# Patient Record
Sex: FEMALE | Race: WHITE
[De-identification: names, ages, dates, MRNs, and addresses within clinical notes are randomized per-mention and may not be internally consistent; named-entity substitution may affect disease eponyms.]

---

## 2021-09-13 ENCOUNTER — HOSPITAL ENCOUNTER (EMERGENCY)
Dept: HOSPITAL 82 - ED | Age: 76
Discharge: LEFT BEFORE DISCHARGE | End: 2021-09-13
Payer: MEDICARE

## 2021-09-13 VITALS — HEIGHT: 64 IN | WEIGHT: 189.38 LBS | BODY MASS INDEX: 32.33 KG/M2

## 2021-09-13 VITALS — DIASTOLIC BLOOD PRESSURE: 70 MMHG | SYSTOLIC BLOOD PRESSURE: 153 MMHG

## 2021-09-13 DIAGNOSIS — U07.1: Primary | ICD-10-CM

## 2021-09-13 DIAGNOSIS — I10: ICD-10-CM

## 2021-09-13 DIAGNOSIS — R09.02: ICD-10-CM

## 2021-09-13 DIAGNOSIS — Z91.19: ICD-10-CM

## 2021-09-13 LAB
ALBUMIN SERPL-MCNC: 3.7 G/DL (ref 3.2–5)
ALP SERPL-CCNC: 59 U/L (ref 38–126)
ANION GAP SERPL CALCULATED.3IONS-SCNC: 13 MMOL/L
AST SERPL-CCNC: 83 U/L (ref 9–36)
BASOPHILS NFR BLD AUTO: 0 % (ref 0–3)
BUN SERPL-MCNC: 21 MG/DL (ref 8–23)
BUN/CREAT SERPL: 17
CHLORIDE SERPL-SCNC: 93 MMOL/L (ref 95–108)
CO2 SERPL-SCNC: 30 MMOL/L (ref 22–30)
CREAT SERPL-MCNC: 1.2 MG/DL (ref 0.5–1)
EOSINOPHIL NFR BLD AUTO: 0 % (ref 0–8)
ERYTHROCYTE [DISTWIDTH] IN BLOOD BY AUTOMATED COUNT: 14.7 % (ref 11.5–15.5)
HCT VFR BLD AUTO: 45.3 % (ref 37–47)
HGB BLD-MCNC: 14.8 G/DL (ref 12–16)
IMM GRANULOCYTES NFR BLD: 0.3 % (ref 0–5)
LYMPHOCYTES NFR BLD: 8 % (ref 15–41)
MCH RBC QN AUTO: 31.5 PG  CALC (ref 26–32)
MCHC RBC AUTO-ENTMCNC: 32.7 G/DL CAL (ref 32–36)
MCV RBC AUTO: 96.4 FL  CALC (ref 80–100)
MONOCYTES NFR BLD AUTO: 13 % (ref 2–13)
MYOGLOBIN SERPL-MCNC: 137 NG/ML (ref 0–62)
NEUTROPHILS # BLD AUTO: 8.05 THOU/UL (ref 2–7.15)
NEUTROPHILS NFR BLD AUTO: 79 % (ref 42–76)
PLATELET # BLD AUTO: 218 THOU/UL (ref 130–400)
POTASSIUM SERPL-SCNC: 3.7 MMOL/L (ref 3.5–5.1)
PROT SERPL-MCNC: 7.2 G/DL (ref 6.3–8.2)
RBC # BLD AUTO: 4.7 MILL/UL (ref 4.2–5.6)
SODIUM SERPL-SCNC: 132 MMOL/L (ref 137–146)

## 2021-09-15 ENCOUNTER — HOSPITAL ENCOUNTER (INPATIENT)
Dept: HOSPITAL 82 - ED | Age: 76
LOS: 13 days | Discharge: NURSING, REHAB OR LONG-TERM CARE | DRG: 177 | End: 2021-09-28
Attending: STUDENT IN AN ORGANIZED HEALTH CARE EDUCATION/TRAINING PROGRAM | Admitting: STUDENT IN AN ORGANIZED HEALTH CARE EDUCATION/TRAINING PROGRAM
Payer: MEDICARE

## 2021-09-15 VITALS — HEIGHT: 60 IN | WEIGHT: 183.65 LBS | BODY MASS INDEX: 36.05 KG/M2

## 2021-09-15 VITALS — DIASTOLIC BLOOD PRESSURE: 78 MMHG | SYSTOLIC BLOOD PRESSURE: 169 MMHG

## 2021-09-15 DIAGNOSIS — R00.1: ICD-10-CM

## 2021-09-15 DIAGNOSIS — I10: ICD-10-CM

## 2021-09-15 DIAGNOSIS — J12.82: ICD-10-CM

## 2021-09-15 DIAGNOSIS — J96.01: ICD-10-CM

## 2021-09-15 DIAGNOSIS — F03.90: ICD-10-CM

## 2021-09-15 DIAGNOSIS — U07.1: Primary | ICD-10-CM

## 2021-09-15 DIAGNOSIS — G93.49: ICD-10-CM

## 2021-09-15 DIAGNOSIS — H35.30: ICD-10-CM

## 2021-09-15 DIAGNOSIS — F41.9: ICD-10-CM

## 2021-09-15 LAB
ALBUMIN SERPL-MCNC: 3.9 G/DL (ref 3.2–5)
ALP SERPL-CCNC: 73 U/L (ref 38–126)
ANION GAP SERPL CALCULATED.3IONS-SCNC: 13 MMOL/L
AST SERPL-CCNC: 66 U/L (ref 9–36)
BASOPHILS NFR BLD AUTO: 0 % (ref 0–3)
BILIRUB UR QL STRIP.AUTO: NEGATIVE
BUN SERPL-MCNC: 18 MG/DL (ref 8–23)
BUN/CREAT SERPL: 18
CHLORIDE SERPL-SCNC: 96 MMOL/L (ref 95–108)
CO2 SERPL-SCNC: 27 MMOL/L (ref 22–30)
COLOR UR AUTO: YELLOW
CREAT SERPL-MCNC: 1 MG/DL (ref 0.5–1)
CRP SERPL-MCNC: 7.3 MG/DL (ref 0–0.9)
EOSINOPHIL NFR BLD AUTO: 0 % (ref 0–8)
ERYTHROCYTE [DISTWIDTH] IN BLOOD BY AUTOMATED COUNT: 14.4 % (ref 11.5–15.5)
GLUCOSE UR STRIP.AUTO-MCNC: NEGATIVE MG/DL
HCT VFR BLD AUTO: 48.1 % (ref 37–47)
HGB BLD-MCNC: 15.5 G/DL (ref 12–16)
HGB UR QL STRIP.AUTO: NEGATIVE
IMM GRANULOCYTES NFR BLD: 1 % (ref 0–5)
KETONES UR STRIP.AUTO-MCNC: NEGATIVE MG/DL
LEUKOCYTE ESTERASE UR QL STRIP.AUTO: NEGATIVE
LYMPHOCYTES NFR BLD: 5 % (ref 15–41)
MCH RBC QN AUTO: 31.1 PG  CALC (ref 26–32)
MCHC RBC AUTO-ENTMCNC: 32.2 G/DL CAL (ref 32–36)
MCV RBC AUTO: 96.4 FL  CALC (ref 80–100)
MONOCYTES NFR BLD AUTO: 8 % (ref 2–13)
NEUTROPHILS # BLD AUTO: 10.36 THOU/UL (ref 2–7.15)
NEUTROPHILS NFR BLD AUTO: 86 % (ref 42–76)
NITRITE UR QL STRIP.AUTO: NEGATIVE
PH UR STRIP.AUTO: 7.5 [PH] (ref 4.5–8)
PLATELET # BLD AUTO: 320 THOU/UL (ref 130–400)
POTASSIUM SERPL-SCNC: 3.7 MMOL/L (ref 3.5–5.1)
PROT SERPL-MCNC: 7.5 G/DL (ref 6.3–8.2)
PROT UR QL STRIP.AUTO: (no result) MG/DL
RBC # BLD AUTO: 4.99 MILL/UL (ref 4.2–5.6)
SODIUM SERPL-SCNC: 132 MMOL/L (ref 137–146)
SP GR UR STRIP.AUTO: 1.02
UROBILINOGEN UR QL STRIP.AUTO: 1 E.U./DL

## 2021-09-15 PROCEDURE — S0166 INJ OLANZAPINE 2.5MG: HCPCS

## 2021-09-15 NOTE — NUR
PT ARRIVED ON UNIT ESCORTED BY ER STAFF VIA STRETCHER. ASSESSMENTS COMPLETE,
PLEASE SEE DOCUMENTATION. PT HAS INDICATED SHE HAS MACULAR DEGENERATION AND IS
LEGALLY BLIND. PT ALSO HAS A HX WITH CARPAL TUNNEL WHICH DECREASES HER ABILITY
TO FEEL ITEMS WITH HER FINGERS. CNA APPLIED TELEMETRY STCKER OVER CALL BA[employer]EL[employer] SO
THAT PT WILL BE ABLE TO FEEL STICKER AND
WHERE TO PRESS TO ASK FOR ASSISTANCE. PT WAS ABLE TO SUCCESSFULLY PRESS CALL
LIGHT X 2 WITHOUT ASSISTANCE. BREATHING IS EVEN AND UNLABORED. NO S/S OF
DISTRESS NOTED. IV REMAINS TO LFA-SL, FLUSHES EASILY. SAFETY PRECAUTIONS
REMAIN IN PLACE. WILL CONTINUE TO MONITOR

## 2021-09-15 NOTE — NUR
Admission Note
 
Report Given to:         LEXX BAILEY LPN
Transported by:           Wheelchair          X Stretcher
 
Transported with:        X Nurse     Transporter   X Patent IV   X O2
                         X Cardiac Monitor
 
Location:                 ICU      X MS2

## 2021-09-16 VITALS — SYSTOLIC BLOOD PRESSURE: 164 MMHG | DIASTOLIC BLOOD PRESSURE: 70 MMHG

## 2021-09-16 VITALS — SYSTOLIC BLOOD PRESSURE: 162 MMHG | DIASTOLIC BLOOD PRESSURE: 65 MMHG

## 2021-09-16 VITALS — DIASTOLIC BLOOD PRESSURE: 72 MMHG | SYSTOLIC BLOOD PRESSURE: 165 MMHG

## 2021-09-16 VITALS — DIASTOLIC BLOOD PRESSURE: 65 MMHG | SYSTOLIC BLOOD PRESSURE: 148 MMHG

## 2021-09-16 VITALS — SYSTOLIC BLOOD PRESSURE: 160 MMHG | DIASTOLIC BLOOD PRESSURE: 70 MMHG

## 2021-09-16 LAB
ALBUMIN SERPL-MCNC: 3 G/DL (ref 3.2–5)
ALP SERPL-CCNC: 63 U/L (ref 38–126)
ANION GAP SERPL CALCULATED.3IONS-SCNC: 13 MMOL/L
AST SERPL-CCNC: 50 U/L (ref 9–36)
BASOPHILS NFR BLD AUTO: 0 % (ref 0–3)
BUN SERPL-MCNC: 18 MG/DL (ref 8–23)
BUN/CREAT SERPL: 19
CHLORIDE SERPL-SCNC: 101 MMOL/L (ref 95–108)
CO2 SERPL-SCNC: 26 MMOL/L (ref 22–30)
CREAT SERPL-MCNC: 0.9 MG/DL (ref 0.5–1)
CRP SERPL-MCNC: 5.2 MG/DL (ref 0–0.9)
EOSINOPHIL NFR BLD AUTO: 0 % (ref 0–8)
ERYTHROCYTE [DISTWIDTH] IN BLOOD BY AUTOMATED COUNT: 14.2 % (ref 11.5–15.5)
HCT VFR BLD AUTO: 44.8 % (ref 37–47)
HGB BLD-MCNC: 14.4 G/DL (ref 12–16)
IMM GRANULOCYTES NFR BLD: 1 % (ref 0–5)
LYMPHOCYTES NFR BLD: 5 % (ref 15–41)
MCH RBC QN AUTO: 31.4 PG  CALC (ref 26–32)
MCHC RBC AUTO-ENTMCNC: 32.1 G/DL CAL (ref 32–36)
MCV RBC AUTO: 97.6 FL  CALC (ref 80–100)
MONOCYTES NFR BLD AUTO: 4 % (ref 2–13)
NEUTROPHILS # BLD AUTO: 6.85 THOU/UL (ref 2–7.15)
NEUTROPHILS NFR BLD AUTO: 89 % (ref 42–76)
PLATELET # BLD AUTO: 314 THOU/UL (ref 130–400)
POTASSIUM SERPL-SCNC: 4.2 MMOL/L (ref 3.5–5.1)
PROT SERPL-MCNC: 6.1 G/DL (ref 6.3–8.2)
RBC # BLD AUTO: 4.59 MILL/UL (ref 4.2–5.6)
SODIUM SERPL-SCNC: 135 MMOL/L (ref 137–146)

## 2021-09-16 PROCEDURE — XW033E5 INTRODUCTION OF REMDESIVIR ANTI-INFECTIVE INTO PERIPHERAL VEIN, PERCUTANEOUS APPROACH, NEW TECHNOLOGY GROUP 5: ICD-10-PCS | Performed by: STUDENT IN AN ORGANIZED HEALTH CARE EDUCATION/TRAINING PROGRAM

## 2021-09-16 NOTE — NUR
PT RESTING QUIETLY. BREATHING IS EVEN AND UNLABORED. NO COMPLAINTS VOICED AT
THIS TIME. SAFETY PRECAUTIONS REMAIN IN PLACE. WILL MONITOR

## 2021-09-16 NOTE — NUR
PT RESTING QUIETLY, NO CONCERNS VOICED. NO S/S OF DISTRESS NOTED. BREATHING IS
EVEN AND UNLABORED. PT CONTINUES ON 7L OF HIGH FLOW O2 VIA NC, TOLERATING
WELL. O2 SATURATIONS REMAINING ABOVE 90% ON CURRENT O2 ORDERS. SAFETY
PRECAUTIONS REMAIN IN PLACE. WILL MONITOR

## 2021-09-16 NOTE — NUR
pt resting quietly in bed, no complaints voiced at this time. denies pain.
breathing even and unlabored. pt refused lovenox injection, educated on the
rationale behind medication, pt continued to refuse. assessments completed,
please see documentation. safety precautions remain in place. will continue to
monitor

## 2021-09-17 VITALS — DIASTOLIC BLOOD PRESSURE: 57 MMHG | SYSTOLIC BLOOD PRESSURE: 134 MMHG

## 2021-09-17 VITALS — DIASTOLIC BLOOD PRESSURE: 74 MMHG | SYSTOLIC BLOOD PRESSURE: 174 MMHG

## 2021-09-17 VITALS — SYSTOLIC BLOOD PRESSURE: 154 MMHG | DIASTOLIC BLOOD PRESSURE: 68 MMHG

## 2021-09-17 VITALS — DIASTOLIC BLOOD PRESSURE: 69 MMHG | SYSTOLIC BLOOD PRESSURE: 161 MMHG

## 2021-09-17 VITALS — SYSTOLIC BLOOD PRESSURE: 162 MMHG | DIASTOLIC BLOOD PRESSURE: 70 MMHG

## 2021-09-17 LAB
ALBUMIN SERPL-MCNC: 3.2 G/DL (ref 3.2–5)
ALP SERPL-CCNC: 63 U/L (ref 38–126)
ANION GAP SERPL CALCULATED.3IONS-SCNC: 12 MMOL/L
AST SERPL-CCNC: 55 U/L (ref 9–36)
BASOPHILS NFR BLD AUTO: 1 % (ref 0–3)
BUN SERPL-MCNC: 23 MG/DL (ref 8–23)
BUN/CREAT SERPL: 24
CHLORIDE SERPL-SCNC: 102 MMOL/L (ref 95–108)
CO2 SERPL-SCNC: 27 MMOL/L (ref 22–30)
CREAT SERPL-MCNC: 1 MG/DL (ref 0.5–1)
EOSINOPHIL NFR BLD AUTO: 0 % (ref 0–8)
ERYTHROCYTE [DISTWIDTH] IN BLOOD BY AUTOMATED COUNT: 14.2 % (ref 11.5–15.5)
HCT VFR BLD AUTO: 47.3 % (ref 37–47)
HGB BLD-MCNC: 15.1 G/DL (ref 12–16)
IMM GRANULOCYTES NFR BLD: 0.9 % (ref 0–5)
LYMPHOCYTES NFR BLD: 8 % (ref 15–41)
MCH RBC QN AUTO: 31.5 PG  CALC (ref 26–32)
MCHC RBC AUTO-ENTMCNC: 31.9 G/DL CAL (ref 32–36)
MCV RBC AUTO: 98.7 FL  CALC (ref 80–100)
MONOCYTES NFR BLD AUTO: 8 % (ref 2–13)
NEUTROPHILS # BLD AUTO: 10.51 THOU/UL (ref 2–7.15)
NEUTROPHILS NFR BLD AUTO: 83 % (ref 42–76)
PLATELET # BLD AUTO: 368 THOU/UL (ref 130–400)
POTASSIUM SERPL-SCNC: 4.4 MMOL/L (ref 3.5–5.1)
PROT SERPL-MCNC: 6.3 G/DL (ref 6.3–8.2)
RBC # BLD AUTO: 4.79 MILL/UL (ref 4.2–5.6)
SODIUM SERPL-SCNC: 137 MMOL/L (ref 137–146)

## 2021-09-17 NOTE — NUR
PT LYING IN BED EYES CLOSED. MED ADMIN, IS REINFORCEMENT, TEACH AND TRAIN ON
INHAILER USE/SE/INDICATION. VERBALIZES UNDERSTANDING.

## 2021-09-17 NOTE — NUR
PATIENT RESTING COMFORTABLY.NORMAL HEART SOUNDS SB ON TELE, CRACKLES IN LOWER
BASES, DIMMINSHED LUNG SOUNDS ELSEWHERE.  PT ON 8L HF READING 94-96%.  ACTIVE
BOWEL SOUNDS LAST BM 9/16, DENIES ANY ISSUES.  #18 IN THE LAC SALINE LOCK PLAN
OF CARE REVIEWED, CALL LIGHT AND BEDSIDE TABLE WITHIN REACH

## 2021-09-17 NOTE — NUR
PT RESTING QUIETLY AT THIS TIME, NO COMPLANITS VOICED. BREATHING EVEN AND
UNLABORED. BREATHING IS SHALLOW AT TIMES. SAFETY PRECAUTIONS REMAIN IN PLACE.
NO S/S OF DISTRESS

## 2021-09-17 NOTE — NUR
BEDSIDE REPORT RECEIVED, PT SITTING IN CHAIR WITH EYES CLOSED. ASSESSMENT
PERFORMED. PT STATING SHE DOESNT FEEL GOOD AND SHE CANT DO ANYTHING FOR
HERSELF. BREAKFAST PLACED AT BEDSIDE TABLE, PT STATED ITS GOING TO TAKE HER A
FEW HOURS TO EAT. JUST LEAVE IT THERE. NO DISTRESS AT THIS TIME WILL CONTINUE
TO MONITOR.

## 2021-09-17 NOTE — NUR
pt remains stable at this time. o2 saturations remain between 90%-93%.
breathing even and unlabored. safety precautions in place, will monitor

## 2021-09-17 NOTE — NUR
UPON WALKING PAST PT ROOM, NOTED THAT PT WAS UP TO BSC WITHOUT HER O2 ON,
PLACED PT BACK ON OYGEN AS PER ORDER AND NOTED THAT PT O2 SATURATION WAS 75%,
PT WAS TAKING AN EXTENDED PERIOD OF TIME TO RECOVER, INCREASED HIGH FLOW TO
10L WHILE PT IS RECOVERING. PT REFUSES TO LAY IN THE PRONE POSITION. WILL
DECREASE O2 ONCE PT HAS FULLY RECOVERED TO A NORMAL O2 SATURATION.

## 2021-09-18 VITALS — DIASTOLIC BLOOD PRESSURE: 69 MMHG | SYSTOLIC BLOOD PRESSURE: 147 MMHG

## 2021-09-18 VITALS — DIASTOLIC BLOOD PRESSURE: 87 MMHG | SYSTOLIC BLOOD PRESSURE: 165 MMHG

## 2021-09-18 VITALS — SYSTOLIC BLOOD PRESSURE: 161 MMHG | DIASTOLIC BLOOD PRESSURE: 66 MMHG

## 2021-09-18 VITALS — DIASTOLIC BLOOD PRESSURE: 92 MMHG | SYSTOLIC BLOOD PRESSURE: 181 MMHG

## 2021-09-18 VITALS — SYSTOLIC BLOOD PRESSURE: 162 MMHG | DIASTOLIC BLOOD PRESSURE: 67 MMHG

## 2021-09-18 VITALS — SYSTOLIC BLOOD PRESSURE: 160 MMHG | DIASTOLIC BLOOD PRESSURE: 67 MMHG

## 2021-09-18 LAB
ALBUMIN SERPL-MCNC: 2.7 G/DL (ref 3.2–5)
ALP SERPL-CCNC: 57 U/L (ref 38–126)
ANION GAP SERPL CALCULATED.3IONS-SCNC: 11 MMOL/L
AST SERPL-CCNC: 48 U/L (ref 9–36)
BASOPHILS NFR BLD AUTO: 0 % (ref 0–3)
BUN SERPL-MCNC: 22 MG/DL (ref 8–23)
BUN/CREAT SERPL: 25
CHLORIDE SERPL-SCNC: 103 MMOL/L (ref 95–108)
CO2 SERPL-SCNC: 26 MMOL/L (ref 22–30)
CREAT SERPL-MCNC: 0.9 MG/DL (ref 0.5–1)
CRP SERPL-MCNC: 1.8 MG/DL (ref 0–0.9)
EOSINOPHIL NFR BLD AUTO: 0 % (ref 0–8)
ERYTHROCYTE [DISTWIDTH] IN BLOOD BY AUTOMATED COUNT: 14 % (ref 11.5–15.5)
HCT VFR BLD AUTO: 43.3 % (ref 37–47)
HGB BLD-MCNC: 14 G/DL (ref 12–16)
IMM GRANULOCYTES NFR BLD: 1.3 % (ref 0–5)
LYMPHOCYTES NFR BLD: 7 % (ref 15–41)
MCH RBC QN AUTO: 31.7 PG  CALC (ref 26–32)
MCHC RBC AUTO-ENTMCNC: 32.3 G/DL CAL (ref 32–36)
MCV RBC AUTO: 98 FL  CALC (ref 80–100)
MONOCYTES NFR BLD AUTO: 10 % (ref 2–13)
NEUTROPHILS # BLD AUTO: 7.94 THOU/UL (ref 2–7.15)
NEUTROPHILS NFR BLD AUTO: 82 % (ref 42–76)
PLATELET # BLD AUTO: 378 THOU/UL (ref 130–400)
POTASSIUM SERPL-SCNC: 3.9 MMOL/L (ref 3.5–5.1)
PROT SERPL-MCNC: 5.6 G/DL (ref 6.3–8.2)
RBC # BLD AUTO: 4.42 MILL/UL (ref 4.2–5.6)
SODIUM SERPL-SCNC: 136 MMOL/L (ref 137–146)

## 2021-09-18 NOTE — NUR
ALERT AND ORIENTED X 3, VS AND ASSEMENT COMPLETED.  DIMMINSHED/CRACKLES LUNG
SOUNDS THROUGHOUT BASES, PT ON 15L 93% NORMAL HEART SOUNDS, LAST TELE READING
SB 46. NON PRODUCTIVE COUGH. PT DENIES NEED FOR COUGH MEDICINE AT
THIS TIME. PT  ACTIVE BOWEL SOUNDS THROUGHOUT ALL QUADRANTS, LAST REPORTED
BOWEL MOVEMENT 9/18. #18 LFA SL, FLUSHED AND PATENT. PLAN OF CARE REVIEWED,
CALL LIGHT AND BEDSIDE TABLE WITHIN REACH.

## 2021-09-18 NOTE — NUR
PATIENT IS RESTING IN BED AND CALM. SITTER AT BED SIDE. PATIENT STATED THAT
SHE IS IN THE [facility] AND IF WE ARE GETTING MORE ANIMALS. REORIENT
PATIENT AND PATIENT VERNALIZE UNDERSTANDING. CALL LIGHT IN REACH.

## 2021-09-18 NOTE — NUR
PATIENT HAS SITTER AT BEDSIDE. PATIENT TOOK HER TELE OFF AND REFUSING THE TELE
TO BE PLACE BACK AT THIS TIME. CALL LIGHT IN REACH. DR. GISELE PENG..

## 2021-09-18 NOTE — NUR
Patient didn't participate with PT intervention today (patient was sound
asleep and PT wasn't able to wake patient up).

## 2021-09-18 NOTE — NUR
PATIENT IS SITTING IN THE SIDE OF THE BED. PATIENT STATED I'M LEAVEING I DON'T
NEED TO BE HERE. PATIENT STATED THAT THEY ARE NOT DOING ANYTHING FOR
HER.PATIENT IS ANXIOUS.  ORIENT PATIENT TO ROOM AND PLACE. ASSESSMENT DONE.
PATIENT IS ALERT AND ORIENT X2. PATIENT DENIES PAIN. PATIENT DENIES SOB. O2 AT
8L HF AND O2 READING 86%.  INCREASE O2 TO 15L HFNC AND O2 READING 90%. PATIENT
STATED SHE GOING HOME. PATIENT IS AGITATED.  ENCOURAGE PATIENT TO STAY BECAUSE
HER O2 IS LOW. PATIENT STATED SHE NOT TAKING HER MEDICATIONS. STATED SHE WANTS
TO SEE DOCTOR. EXPLAIN TO PATIENT THAT DOCTOR IS ROUNDING WILL BE SEEING HER
SOON. SAFETY PRECAUTIONS REINFORCED.  BED ALARM IN PLACE AND CALL LIGHT IN
REACH. NOTIFIED TAN EPSTEIN AND DR. JAQUEZ ABOUT PATIENT WANTING TO LEAVE, BP
181/92, AND O2 AT 15L HFNC.

## 2021-09-18 NOTE — NUR
PATIENT SET OFF THE BED ALARM. FOUND PATIENT KNEELING  IN THE FLOOR NEXT TO
 THE BED AND NAKED. PATIENT DENIES FALLING.  ASSISTED PATINET WITH GOWN AND
BACK TO BED. PATIENT IS UNSTEADY. PATIENT STATED SHE WANTS TO LEAVE. O2 15L
HFNC AND READING 93%. BED ALRAM AND CALL LIGHT IN REACH. NOTIFIED DR. JAQUEZ
ABOUT PATIENT. ORDERS RECEIVED.

## 2021-09-19 VITALS — SYSTOLIC BLOOD PRESSURE: 160 MMHG | DIASTOLIC BLOOD PRESSURE: 70 MMHG

## 2021-09-19 VITALS — DIASTOLIC BLOOD PRESSURE: 70 MMHG | SYSTOLIC BLOOD PRESSURE: 150 MMHG

## 2021-09-19 VITALS — SYSTOLIC BLOOD PRESSURE: 165 MMHG | DIASTOLIC BLOOD PRESSURE: 66 MMHG

## 2021-09-19 VITALS — SYSTOLIC BLOOD PRESSURE: 163 MMHG | DIASTOLIC BLOOD PRESSURE: 74 MMHG

## 2021-09-19 VITALS — DIASTOLIC BLOOD PRESSURE: 72 MMHG | SYSTOLIC BLOOD PRESSURE: 158 MMHG

## 2021-09-19 VITALS — SYSTOLIC BLOOD PRESSURE: 130 MMHG | DIASTOLIC BLOOD PRESSURE: 60 MMHG

## 2021-09-19 LAB
ALBUMIN SERPL-MCNC: 2.6 G/DL (ref 3.2–5)
ALP SERPL-CCNC: 54 U/L (ref 38–126)
ANION GAP SERPL CALCULATED.3IONS-SCNC: 11 MMOL/L
AST SERPL-CCNC: 41 U/L (ref 9–36)
BASOPHILS NFR BLD AUTO: 0 % (ref 0–3)
BUN SERPL-MCNC: 21 MG/DL (ref 8–23)
BUN/CREAT SERPL: 25
CHLORIDE SERPL-SCNC: 105 MMOL/L (ref 95–108)
CO2 SERPL-SCNC: 26 MMOL/L (ref 22–30)
CREAT SERPL-MCNC: 0.8 MG/DL (ref 0.5–1)
EOSINOPHIL NFR BLD AUTO: 0 % (ref 0–8)
ERYTHROCYTE [DISTWIDTH] IN BLOOD BY AUTOMATED COUNT: 13.9 % (ref 11.5–15.5)
HCT VFR BLD AUTO: 45.1 % (ref 37–47)
HGB BLD-MCNC: 14.3 G/DL (ref 12–16)
IMM GRANULOCYTES NFR BLD: 1.2 % (ref 0–5)
LYMPHOCYTES NFR BLD: 5 % (ref 15–41)
MCH RBC QN AUTO: 31.5 PG  CALC (ref 26–32)
MCHC RBC AUTO-ENTMCNC: 31.7 G/DL CAL (ref 32–36)
MCV RBC AUTO: 99.3 FL  CALC (ref 80–100)
MONOCYTES NFR BLD AUTO: 11 % (ref 2–13)
NEUTROPHILS # BLD AUTO: 9.38 THOU/UL (ref 2–7.15)
NEUTROPHILS NFR BLD AUTO: 82 % (ref 42–76)
PLATELET # BLD AUTO: 411 THOU/UL (ref 130–400)
POTASSIUM SERPL-SCNC: 4.2 MMOL/L (ref 3.5–5.1)
PROT SERPL-MCNC: 5.4 G/DL (ref 6.3–8.2)
RBC # BLD AUTO: 4.54 MILL/UL (ref 4.2–5.6)
SODIUM SERPL-SCNC: 137 MMOL/L (ref 137–146)

## 2021-09-19 NOTE — NUR
PT SLEEPING, APPEARS CALM, AWOKE TO MY VOICE. OXYGEN SAT @95% ON 12L
PHYSICIAN AND APRN IN WITH PT AT THIS TIME.

## 2021-09-19 NOTE — NUR
RECEIVED CALL FROM PT'S SON, INFORMED HIM OF CARE STATUS AND PT'S STATUS AT
THIS TIME. VERBALIZED UNDERSTANDING AND THANKS FOR CARE PROVIDED. I ENCOURAGED
HIM TO CALL BACK ANYTIME FOR INFORMATION AND UPDATE.

## 2021-09-19 NOTE — NUR
t medicated as orders provide for AM meds. V/s assessed at this time. Pt
oiced mild anxiety over having to be "in this room in the hospital."  I
eassured her when her oxygen sat levels improve she can be discharged. Pt is
6% on 15Lnc High flow, titated to 12L with stable 02 sats holding at 95% Will
ontinue to monitor for maintaining 02 sats.  Pt denies n/v/d or cough at this
bang. Pt answered questions appropriately w/year, location/circumstance, self,
ob.  Sitter is at side and monitor in place.

## 2021-09-19 NOTE — NUR
PT CALLED TO REPORT BEEPING IN ROOM. IV ANTI-VIRAL COMPLETE AND IV ANTIBIOTIC
THERAPY ADMINISTERED AT THIS TIME. PT IS ASKING QUESTIONS ABOUT THE
MEDICATIONS WE ARE GIVING HER AND QUESTIONING HOW LONG IT IS TAKING. I
REASSURED HER THAT IT IS NOT A QUICK ILLNESS TO OVER AND THAT OUR MAIN GOAL AT
THIS POINT IS TO KEEP HER OXYGEN SAT LEVELS STABLE. I EDUCATED HER ON THE
PROCESS IF WE ARE UNABLE TO MAINTAIN HER 02 LEVELS, SHE VERBALIZED
UNDERSTANDING. WE PRACTICED THE CALL LIGHT SEVERAL TIMES WHILE I WAS IN THE
ROOM, SHE EXPRESSED CONCERN THAT SHE FELT IT WASN'T WORKING. BED ALARM ON
W/MONITOR ON AND SOUND UP AT NURSES STATION FOR MONITORING.

## 2021-09-19 NOTE — NUR
ALERT AND ORIENTED X 3, VS AND ASSEMENT COMPLETED.  DIMMINSHED/CRACKLES LUNG
SOUNDS THROUGHOUT BASES, PT ON 10L 93% NORMAL HEART SOUNDS, LAST TELE READING
SB 56. NON PRODUCTIVE COUGH. PT DENIES NEED FOR COUGH MEDICINE AT
THIS TIME. PT  ACTIVE BOWEL SOUNDS THROUGHOUT ALL QUADRANTS, LAST REPORTED
BOWEL MOVEMENT 9/19. #18 LFA SL, FLUSHED AND PATENT. PLAN OF CARE REVIEWED,
CALL LIGHT AND BEDSIDE TABLE WITHIN REACH.

## 2021-09-19 NOTE — NUR
PATIENT RESTING COMFROTABLY, SITTER OUTSIDE OF DOOR DENIES ANY NEEDS AT THIS
TIME, CALL LIGHT AND BEDSIDE TABLE WITHIN REACH.

## 2021-09-19 NOTE — NUR
PT ASSISTED TO BSC AND BACK TO THE BED. PT REMINDED OF CALL LIGHT, DUE TO
SETTING OFF BED ALARM. PT VOICES FRUSTRATION AT HAVING TO CALL FOR ASSISTANCE,
STATING "I CAN GET UP MYSELF, I DON'T NEED YOU."  I REMINDED HER OF BEING
FOUND ON THE FLOOR THE PRIOR DAY.  SHE ASKED "WHAT NURSE SAID THAT? I WAS NOT
ON THE FLOOR."  PT ASKED FOR PUDDING, PROVIDED.  SHE IS NOW REFUSING THE
PUDDING AT THIS TIME AND SAYING SHE WANTS TO WAIT TO EAT THAT, IT IS LEFT AT
BEDSIDE. PT NOW BACK IN THE BED W/TABLE W/IN REACH. SHE HAS BEEN REMINDED OF
CALL LIGHT USE MULTIPLE TIMES AND BED ALARM PLACED BACK ON SENSITIVE MODE WITH
MONITOR VOLUME UP AT NURSES STATION.

## 2021-09-20 VITALS — SYSTOLIC BLOOD PRESSURE: 185 MMHG | DIASTOLIC BLOOD PRESSURE: 74 MMHG

## 2021-09-20 VITALS — SYSTOLIC BLOOD PRESSURE: 156 MMHG | DIASTOLIC BLOOD PRESSURE: 68 MMHG

## 2021-09-20 VITALS — SYSTOLIC BLOOD PRESSURE: 148 MMHG | DIASTOLIC BLOOD PRESSURE: 65 MMHG

## 2021-09-20 VITALS — DIASTOLIC BLOOD PRESSURE: 69 MMHG | SYSTOLIC BLOOD PRESSURE: 164 MMHG

## 2021-09-20 VITALS — SYSTOLIC BLOOD PRESSURE: 164 MMHG | DIASTOLIC BLOOD PRESSURE: 69 MMHG

## 2021-09-20 VITALS — DIASTOLIC BLOOD PRESSURE: 70 MMHG | SYSTOLIC BLOOD PRESSURE: 153 MMHG

## 2021-09-20 LAB
ALBUMIN SERPL-MCNC: 2.7 G/DL (ref 3.2–5)
ALP SERPL-CCNC: 58 U/L (ref 38–126)
ANION GAP SERPL CALCULATED.3IONS-SCNC: 10 MMOL/L
AST SERPL-CCNC: 33 U/L (ref 9–36)
BASOPHILS NFR BLD AUTO: 0 % (ref 0–3)
BUN SERPL-MCNC: 21 MG/DL (ref 8–23)
BUN/CREAT SERPL: 24
CHLORIDE SERPL-SCNC: 103 MMOL/L (ref 95–108)
CO2 SERPL-SCNC: 27 MMOL/L (ref 22–30)
CREAT SERPL-MCNC: 0.9 MG/DL (ref 0.5–1)
CRP SERPL-MCNC: 3.9 MG/DL (ref 0–0.9)
EOSINOPHIL NFR BLD AUTO: 1 % (ref 0–8)
ERYTHROCYTE [DISTWIDTH] IN BLOOD BY AUTOMATED COUNT: 14.1 % (ref 11.5–15.5)
HCT VFR BLD AUTO: 45.4 % (ref 37–47)
HGB BLD-MCNC: 14.2 G/DL (ref 12–16)
IMM GRANULOCYTES NFR BLD: 1.8 % (ref 0–5)
LYMPHOCYTES NFR BLD: 5 % (ref 15–41)
MCH RBC QN AUTO: 31 PG  CALC (ref 26–32)
MCHC RBC AUTO-ENTMCNC: 31.3 G/DL CAL (ref 32–36)
MCV RBC AUTO: 99.1 FL  CALC (ref 80–100)
MONOCYTES NFR BLD AUTO: 10 % (ref 2–13)
NEUTROPHILS # BLD AUTO: 10.03 THOU/UL (ref 2–7.15)
NEUTROPHILS NFR BLD AUTO: 82 % (ref 42–76)
PLATELET # BLD AUTO: 388 THOU/UL (ref 130–400)
POTASSIUM SERPL-SCNC: 4.2 MMOL/L (ref 3.5–5.1)
PROT SERPL-MCNC: 5.4 G/DL (ref 6.3–8.2)
RBC # BLD AUTO: 4.58 MILL/UL (ref 4.2–5.6)
SODIUM SERPL-SCNC: 136 MMOL/L (ref 137–146)

## 2021-09-20 NOTE — NUR
PT SITTING UP FINISHING DINNER. SPO2 90% ON HIGH FLOW NASAL CANNULA AT
10L/MIN. WATER FOR HUMIDITY REPLACED. PT RESPIRATIONS REGULAR AND UNLABORED.
ABLE TO HOLD CONVERSATION WITHOUT DROP IN SP02. PT ASSISTED UP TO BSC TO VOID
AND BACK TO BED.

## 2021-09-20 NOTE — NUR
PATIENT SLEEPING SOUNDLY, IN NO APPARENT DISTRESS, AWOKEN BY WRITTER, DENIES
ANY NEEDS, DRY COUGH NOTED. CALL LIGHT AND BEDSIDE TABLE WITHIN REACH.

## 2021-09-20 NOTE — NUR
PATIENT OBSERVED GETTTING OUT OF BED AND ON THE BEDSIDE COMMODE, STATES SHE
DOES NOT LIKE THE SOUND OF THE BED ALARM GOING OFF, PT ADVISED TO CALL AND WE
WOULD ASSIST HER. PT ASSSITED BACK TO BED, CALL LIGHT AND BEDSIDE TABLE WITHIN
REACH.

## 2021-09-21 VITALS — DIASTOLIC BLOOD PRESSURE: 75 MMHG | SYSTOLIC BLOOD PRESSURE: 180 MMHG

## 2021-09-21 VITALS — DIASTOLIC BLOOD PRESSURE: 58 MMHG | SYSTOLIC BLOOD PRESSURE: 121 MMHG

## 2021-09-21 VITALS — SYSTOLIC BLOOD PRESSURE: 153 MMHG | DIASTOLIC BLOOD PRESSURE: 69 MMHG

## 2021-09-21 VITALS — DIASTOLIC BLOOD PRESSURE: 91 MMHG | SYSTOLIC BLOOD PRESSURE: 159 MMHG

## 2021-09-21 VITALS — DIASTOLIC BLOOD PRESSURE: 65 MMHG | SYSTOLIC BLOOD PRESSURE: 152 MMHG

## 2021-09-21 LAB
ALBUMIN SERPL-MCNC: 2.6 G/DL (ref 3.2–5)
ALP SERPL-CCNC: 56 U/L (ref 38–126)
ANION GAP SERPL CALCULATED.3IONS-SCNC: 9 MMOL/L
AST SERPL-CCNC: 30 U/L (ref 9–36)
BASOPHILS NFR BLD AUTO: 0 % (ref 0–3)
BUN SERPL-MCNC: 24 MG/DL (ref 8–23)
BUN/CREAT SERPL: 25
CHLORIDE SERPL-SCNC: 107 MMOL/L (ref 95–108)
CO2 SERPL-SCNC: 25 MMOL/L (ref 22–30)
CREAT SERPL-MCNC: 1 MG/DL (ref 0.5–1)
EOSINOPHIL NFR BLD AUTO: 0 % (ref 0–8)
ERYTHROCYTE [DISTWIDTH] IN BLOOD BY AUTOMATED COUNT: 14.1 % (ref 11.5–15.5)
HCT VFR BLD AUTO: 45.6 % (ref 37–47)
HGB BLD-MCNC: 14.3 G/DL (ref 12–16)
IMM GRANULOCYTES NFR BLD: 1.8 % (ref 0–5)
LYMPHOCYTES NFR BLD: 5 % (ref 15–41)
MCH RBC QN AUTO: 30.9 PG  CALC (ref 26–32)
MCHC RBC AUTO-ENTMCNC: 31.4 G/DL CAL (ref 32–36)
MCV RBC AUTO: 98.5 FL  CALC (ref 80–100)
MONOCYTES NFR BLD AUTO: 11 % (ref 2–13)
NEUTROPHILS # BLD AUTO: 9.12 THOU/UL (ref 2–7.15)
NEUTROPHILS NFR BLD AUTO: 82 % (ref 42–76)
PLATELET # BLD AUTO: 414 THOU/UL (ref 130–400)
POTASSIUM SERPL-SCNC: 4.3 MMOL/L (ref 3.5–5.1)
PROT SERPL-MCNC: 5.4 G/DL (ref 6.3–8.2)
RBC # BLD AUTO: 4.63 MILL/UL (ref 4.2–5.6)
SODIUM SERPL-SCNC: 137 MMOL/L (ref 137–146)

## 2021-09-21 NOTE — NUR
PT RESTING COMFORTABLY IN BED.  NO SIGNS OF PAIN OR DISTRESS.  PT HAS NO
IMMEDIATE NEEDS AT THIS TIME.  SAFETY PRECAUTIONS MAINTAINED. CALL LIGHT
WITHIN PATIENT REACH.  WILL CONTINUE TO MONITOR

## 2021-09-21 NOTE — NUR
PT AWAKE ALERT AND ORIENTED.  PT DENIES ANY PAIN OR DISTRESS AT THIS TIME.  IV
INTACT AND PATENT.  VS AND ASSESSMENT COMPLETE.  LUNGS DIMINISHED. BREATHS
EVEN AND UNLABORED ON HIGH FLOW OXYGEN VIA NASAL CANNULA.  PERIPHERAL PULSES
PALPABLE AND STRONG.  ABDOMEN ROUND AND NONTENDER.  SKIN IS INTACT.  SAFETY
PRECAUTIONS ARE IN PLACE AND CALL LIGHT IS WITHIN PATIENTS REACH.  WILL
MONITOR PT CLOSELY

## 2021-09-21 NOTE — NUR
ASSESSMENT COMPLETE. PATIENT ALERT AND ORIENTED. ANSWERS APPROPRIATELY. DENIES
ANY PAIN OR SHORTNESS OF BREATH. CALL LIGHT AND BELONGINGS WITHIN REACH.

## 2021-09-22 VITALS — SYSTOLIC BLOOD PRESSURE: 151 MMHG | DIASTOLIC BLOOD PRESSURE: 65 MMHG

## 2021-09-22 VITALS — SYSTOLIC BLOOD PRESSURE: 140 MMHG | DIASTOLIC BLOOD PRESSURE: 68 MMHG

## 2021-09-22 VITALS — DIASTOLIC BLOOD PRESSURE: 81 MMHG | SYSTOLIC BLOOD PRESSURE: 187 MMHG

## 2021-09-22 VITALS — DIASTOLIC BLOOD PRESSURE: 69 MMHG | SYSTOLIC BLOOD PRESSURE: 162 MMHG

## 2021-09-22 VITALS — SYSTOLIC BLOOD PRESSURE: 142 MMHG | DIASTOLIC BLOOD PRESSURE: 64 MMHG

## 2021-09-22 VITALS — DIASTOLIC BLOOD PRESSURE: 72 MMHG | SYSTOLIC BLOOD PRESSURE: 166 MMHG

## 2021-09-22 VITALS — DIASTOLIC BLOOD PRESSURE: 68 MMHG | SYSTOLIC BLOOD PRESSURE: 152 MMHG

## 2021-09-22 VITALS — DIASTOLIC BLOOD PRESSURE: 105 MMHG | SYSTOLIC BLOOD PRESSURE: 167 MMHG

## 2021-09-22 LAB
ALBUMIN SERPL-MCNC: 2.8 G/DL (ref 3.2–5)
ALP SERPL-CCNC: 62 U/L (ref 38–126)
ANION GAP SERPL CALCULATED.3IONS-SCNC: 7 MMOL/L
AST SERPL-CCNC: 30 U/L (ref 9–36)
BASOPHILS NFR BLD AUTO: 0 % (ref 0–3)
BUN SERPL-MCNC: 23 MG/DL (ref 8–23)
BUN/CREAT SERPL: 26
CHLORIDE SERPL-SCNC: 106 MMOL/L (ref 95–108)
CO2 SERPL-SCNC: 27 MMOL/L (ref 22–30)
CREAT SERPL-MCNC: 0.9 MG/DL (ref 0.5–1)
CRP SERPL-MCNC: 3.6 MG/DL (ref 0–0.9)
EOSINOPHIL NFR BLD AUTO: 0 % (ref 0–8)
ERYTHROCYTE [DISTWIDTH] IN BLOOD BY AUTOMATED COUNT: 14.1 % (ref 11.5–15.5)
HCT VFR BLD AUTO: 46.2 % (ref 37–47)
HGB BLD-MCNC: 14.9 G/DL (ref 12–16)
IMM GRANULOCYTES NFR BLD: 1.9 % (ref 0–5)
LYMPHOCYTES NFR BLD: 5 % (ref 15–41)
MCH RBC QN AUTO: 31.6 PG  CALC (ref 26–32)
MCHC RBC AUTO-ENTMCNC: 32.3 G/DL CAL (ref 32–36)
MCV RBC AUTO: 98.1 FL  CALC (ref 80–100)
MONOCYTES NFR BLD AUTO: 11 % (ref 2–13)
NEUTROPHILS # BLD AUTO: 10.46 THOU/UL (ref 2–7.15)
NEUTROPHILS NFR BLD AUTO: 82 % (ref 42–76)
PLATELET # BLD AUTO: 403 THOU/UL (ref 130–400)
POTASSIUM SERPL-SCNC: 4.3 MMOL/L (ref 3.5–5.1)
PROT SERPL-MCNC: 5.9 G/DL (ref 6.3–8.2)
RBC # BLD AUTO: 4.71 MILL/UL (ref 4.2–5.6)
SODIUM SERPL-SCNC: 136 MMOL/L (ref 137–146)

## 2021-09-22 NOTE — NUR
Alert and oriented x3. Patient laying in bed. Normal heart sounds,
last tele reading sr 61. Clear/Dimminished lung sounds, on 15L HF reading 95%.
Active bowel sounds throughout all quadrants. Last reported bowel movement
9/22. #18 in LFA Sl, flushed and patent. Plan of care reviewed with patient,
call light and bedside table within reach.

## 2021-09-22 NOTE — NUR
Patient participated with PT intervention today.  Patient carried out B LE
AAROM exercises:  hip flexion, hip adduction, hip abduction, hamstring curls,
knee extension, and ankle pumps for 10 reps x 2 sets with occasional verbal
and tactile cuing to help decrease trick movements and fall risks.  Patient
participated with log rolling bed mobility ADLs with 1 to 2 attempts with
verbal and tactile cuing to help decrease trick movements and fall risks.

## 2021-09-22 NOTE — NUR
PATIENT RESTING IN BED AT THIS TIME. PATIENT REMAINS ON 15L OF HI-FLOW O2 AT
THIS TIME. SPO2 CURRENTLY IS 90%. PATIENT SIDERAILS ARE UP CALL LIGHT WITHIN
REACH. PATIENT BED ALARM ENGAGED AND PATIENT REMAIS TO BE VISUALIZED BY
CAMERA. PATIENT REFUSING TO EAT LUNCH STATES SHE IS TOO TIRE AND NEEDS TO
SLEEP. WILL CONTINUE TO MONITOR.

## 2021-09-22 NOTE — NUR
PATIENT LAYING IN BED AT THIS TIME PATIENT IS ALERT AND ORIENTED AND WAS
REMINDED NOT TO GET UP WITHOUT ASSISTANCE DUE TO HER VISUAL FIELDS BEING ALTER
DUE TO HER HISTORY OF MACULAR DEGENERATION. PATIENT AGREED AND BED ALARM ON
AND PATIENT REMAINS ON CAMERA. RN ASSESSMENT DONE AT THIS TIME SEE
INTERVENTIONS. LUNG CHAVEZ ARE CLEAR IN UPPER FIELDS AND DIMINISHED IN LOWER,
PATIENT IS ON HI-FLOW O2 AT 15L AND HER SPO2 IS 92%. PATIENT IS ON TELE
MONITOR AND BEING MONITORED BY ED. SIDERAILS ARE UP X 2 CALL LIGHT WITHIN
REACH. PATIENT EXPRESS BEING VERY TIRED DUE TO NOT SLEEPING DURING THE NIGHT.
WILL CONTINUE TO MONITOR.

## 2021-09-22 NOTE — NUR
PATIENT RESTING COMFORTABLY IN BED AT THIS TIME. PATIENT DENIES ANY NEEDS AND
PATIENT REMAINS ON HI-FLOW O2 AT 15L AND SPO2 IS CURRENTLY 93% AT THIS TIME.
PATIENT CONTINUES TO HAVE TELE MONITOR IN PLACE AND BEING MONITORED BY ED.
SIDERAILS ARE UP CALL LIGHT WITHIN REACH.

## 2021-09-22 NOTE — NUR
PATEINT RESTING IN BED AT THIS TIME WITH EYES CLOSED RESPIRATIONS EASY AND
UNLABORED. 02 ON AT 15L HI-FLOW AND SPO2 IS 95% AT THIS TIME. SIDERAILS ARE UP
X 3 BED ALARM ENGAGED AND PATIENT BEING MONITORED BY CAMERA. SON OF PATIENT
CALLED TO CHECK ON MOTHER AT THIS TIME AND REPORT GIVEN. WILL CONTINUE TO
MONITOR.

## 2021-09-22 NOTE — NUR
PATIENT ASSISTED TO BEDSIDE COMMODE TO URINATE AT THIS TIME. PATIENT THEN
ASSISTED TO CHAIR WITHOUT ISSUES. O2 REMAINS ON AT 15L HI-FLOW AT THIS TIME.
CALL LIGHT IS WITHIN REACH PATIENT REMAINS ON CAMERA.

## 2021-09-22 NOTE — NUR
PATIENT GIVEN 0.5MG OF XANAX AT THIS TIME DUE TO FEELING MILDLY ANXIOUS
REGARDING TAKING THE IV THEARPY (ZITHROMAX) MEDICATION. IV AZTHROMYCIN WAS
HUNG AND RAN IN A SLOWER RATE TO DECREASE PAIN AT THIS TIME. SIDERAILS ARE UP
CALL LIGHT WITHIN REACH AND PATIENT VERBALIZES UNDERSTANDING OF NOT GETTING UP
WITHOUT ASSISTANCE.

## 2021-09-23 VITALS — DIASTOLIC BLOOD PRESSURE: 61 MMHG | SYSTOLIC BLOOD PRESSURE: 121 MMHG

## 2021-09-23 VITALS — DIASTOLIC BLOOD PRESSURE: 67 MMHG | SYSTOLIC BLOOD PRESSURE: 140 MMHG

## 2021-09-23 VITALS — SYSTOLIC BLOOD PRESSURE: 146 MMHG | DIASTOLIC BLOOD PRESSURE: 86 MMHG

## 2021-09-23 VITALS — SYSTOLIC BLOOD PRESSURE: 154 MMHG | DIASTOLIC BLOOD PRESSURE: 68 MMHG

## 2021-09-23 VITALS — DIASTOLIC BLOOD PRESSURE: 64 MMHG | SYSTOLIC BLOOD PRESSURE: 130 MMHG

## 2021-09-23 LAB
ALBUMIN SERPL-MCNC: 2.9 G/DL (ref 3.2–5)
ALP SERPL-CCNC: 65 U/L (ref 38–126)
ANION GAP SERPL CALCULATED.3IONS-SCNC: 10 MMOL/L
AST SERPL-CCNC: 29 U/L (ref 9–36)
BASOPHILS NFR BLD AUTO: 0 % (ref 0–3)
BUN SERPL-MCNC: 29 MG/DL (ref 8–23)
BUN/CREAT SERPL: 28
CHLORIDE SERPL-SCNC: 106 MMOL/L (ref 95–108)
CO2 SERPL-SCNC: 26 MMOL/L (ref 22–30)
CREAT SERPL-MCNC: 1 MG/DL (ref 0.5–1)
EOSINOPHIL NFR BLD AUTO: 0 % (ref 0–8)
ERYTHROCYTE [DISTWIDTH] IN BLOOD BY AUTOMATED COUNT: 14.3 % (ref 11.5–15.5)
HCT VFR BLD AUTO: 49.4 % (ref 37–47)
HGB BLD-MCNC: 15.5 G/DL (ref 12–16)
IMM GRANULOCYTES NFR BLD: 1.5 % (ref 0–5)
LYMPHOCYTES NFR BLD: 5 % (ref 15–41)
MCH RBC QN AUTO: 31.3 PG  CALC (ref 26–32)
MCHC RBC AUTO-ENTMCNC: 31.4 G/DL CAL (ref 32–36)
MCV RBC AUTO: 99.8 FL  CALC (ref 80–100)
MONOCYTES NFR BLD AUTO: 10 % (ref 2–13)
NEUTROPHILS # BLD AUTO: 11.22 THOU/UL (ref 2–7.15)
NEUTROPHILS NFR BLD AUTO: 83 % (ref 42–76)
PLATELET # BLD AUTO: 390 THOU/UL (ref 130–400)
POTASSIUM SERPL-SCNC: 4.7 MMOL/L (ref 3.5–5.1)
PROT SERPL-MCNC: 5.9 G/DL (ref 6.3–8.2)
RBC # BLD AUTO: 4.95 MILL/UL (ref 4.2–5.6)
SODIUM SERPL-SCNC: 137 MMOL/L (ref 137–146)

## 2021-09-23 NOTE — NUR
PT ALERT AND ORIENTED. PT SITTING ON SIDE OF BED.  PT DENIES AND PAIN OR
DISCOMFORT AT THIS TIME.  VS AND ASSESSMENT COMPLETE.  LUNGS SOUNDS ARE
DIMINISHED, BREATHS EVEN AND UNLABORED. BOWEL SOUNDS ACTIVE.  ABD ROUND,SOFT
NONTENDER.  PERIPHERAL PULSES PALPABLE. IV INTACT AND PATENT.  PT WILL
CONTINUE O2 THERAPY.  SAFETY MEASURES IN PLACE.  CALL LIGHT WITHIN PATIENTS
REACH.  WILL MONITOR PATIENT CLOSELY

## 2021-09-23 NOTE — NUR
Patient participated with PT intervention today.  Patient carried out B LE
AROM exercises in seated position:  hip flexion, hamstring curls, hip
adduction, hip abduction, knee extension, and ankle pumps for 15 reps x 2 sets
with occasional verbal and tactile cuing to help decrease fall risks and
improve transfer capability.  Patient also participated with log rolling bed
mobility and sit to stand push off transfer ADLs with 1 to 3 reps with
occasional verbal and tactile cuing to help decrease trick movements and fall
risks with transfer protocol (patient with macular degeneration).

## 2021-09-23 NOTE — NUR
Alert and oriented x3. Patient laying in bed. Normal heart sounds,
last tele reading sr 68. Clear/Dimminished lung sounds, on 15L HF reading 93%.
Active bowel sounds throughout all quadrants. Last reported bowel movement
9/23. No current IV site, patient dislodged 2 in previous shift. RASHIDA Arteaga
states she advised she would attempt in AM.  Plan of care reviewed with
patient, call light and bedside table within reach.

## 2021-09-24 VITALS — DIASTOLIC BLOOD PRESSURE: 60 MMHG | SYSTOLIC BLOOD PRESSURE: 129 MMHG

## 2021-09-24 VITALS — SYSTOLIC BLOOD PRESSURE: 132 MMHG | DIASTOLIC BLOOD PRESSURE: 62 MMHG

## 2021-09-24 VITALS — DIASTOLIC BLOOD PRESSURE: 62 MMHG | SYSTOLIC BLOOD PRESSURE: 150 MMHG

## 2021-09-24 VITALS — DIASTOLIC BLOOD PRESSURE: 68 MMHG | SYSTOLIC BLOOD PRESSURE: 138 MMHG

## 2021-09-24 VITALS — SYSTOLIC BLOOD PRESSURE: 132 MMHG | DIASTOLIC BLOOD PRESSURE: 88 MMHG

## 2021-09-24 VITALS — DIASTOLIC BLOOD PRESSURE: 59 MMHG | SYSTOLIC BLOOD PRESSURE: 135 MMHG

## 2021-09-24 VITALS — DIASTOLIC BLOOD PRESSURE: 61 MMHG | SYSTOLIC BLOOD PRESSURE: 142 MMHG

## 2021-09-24 VITALS — SYSTOLIC BLOOD PRESSURE: 130 MMHG | DIASTOLIC BLOOD PRESSURE: 62 MMHG

## 2021-09-24 LAB
ALBUMIN SERPL-MCNC: 2.8 G/DL (ref 3.2–5)
ALP SERPL-CCNC: 63 U/L (ref 38–126)
ANION GAP SERPL CALCULATED.3IONS-SCNC: 10 MMOL/L
AST SERPL-CCNC: 28 U/L (ref 9–36)
BASOPHILS NFR BLD AUTO: 0 % (ref 0–3)
BUN SERPL-MCNC: 36 MG/DL (ref 8–23)
BUN/CREAT SERPL: 31
CHLORIDE SERPL-SCNC: 102 MMOL/L (ref 95–108)
CO2 SERPL-SCNC: 28 MMOL/L (ref 22–30)
CREAT SERPL-MCNC: 1.1 MG/DL (ref 0.5–1)
CRP SERPL-MCNC: 2.3 MG/DL (ref 0–0.9)
EOSINOPHIL NFR BLD AUTO: 0 % (ref 0–8)
ERYTHROCYTE [DISTWIDTH] IN BLOOD BY AUTOMATED COUNT: 14.2 % (ref 11.5–15.5)
HCT VFR BLD AUTO: 47.6 % (ref 37–47)
HGB BLD-MCNC: 14.9 G/DL (ref 12–16)
IMM GRANULOCYTES NFR BLD: 1.1 % (ref 0–5)
LYMPHOCYTES NFR BLD: 4 % (ref 15–41)
MCH RBC QN AUTO: 31.1 PG  CALC (ref 26–32)
MCHC RBC AUTO-ENTMCNC: 31.3 G/DL CAL (ref 32–36)
MCV RBC AUTO: 99.4 FL  CALC (ref 80–100)
MONOCYTES NFR BLD AUTO: 7 % (ref 2–13)
NEUTROPHILS # BLD AUTO: 10.69 THOU/UL (ref 2–7.15)
NEUTROPHILS NFR BLD AUTO: 88 % (ref 42–76)
PLATELET # BLD AUTO: 340 THOU/UL (ref 130–400)
POTASSIUM SERPL-SCNC: 4.6 MMOL/L (ref 3.5–5.1)
PROT SERPL-MCNC: 5.8 G/DL (ref 6.3–8.2)
RBC # BLD AUTO: 4.79 MILL/UL (ref 4.2–5.6)
SODIUM SERPL-SCNC: 136 MMOL/L (ref 137–146)

## 2021-09-24 NOTE — NUR
Patient participated with PT intervention today.  Patient did seated B LE AROM
exercises doing hip flexion, hip adduction, hip abduction, hamstring curls,
knee extension, and ankle pumps for 15 reps x 2 sets with occasional verbal
and tactile cuing to help decrease trick movements and fall risks.  Patient
did log rolling bed mobility and sit to stand push off transfer ADLs (1 to 3
reps) with occasional verbal and tactile cuing to help decrease fall risks.
Patient also did gait training with CGA x 1 covering 15 feet x 2 reps on level
surfaces.

## 2021-09-24 NOTE — NUR
PT TRANSFERED FROM M/S. REPORT FROM ARANZA PARRISH, PT MADE COMFORTABLE IN BED,
PLACED ON MONITOR, DENIES ANY FURTHER NEEDS AT THIS TIME

## 2021-09-24 NOTE — NUR
PT MEDICATED AS ORDERS PROVIDE AND ASSISTED POSTIIONING IN THE BED. PT ASKED
FOR ME TO COVER HER UP, I ENCOURAGED HER TO SELF COVER, SHE REPLIED "I CAN'T"
PT WAS JUST SITTING ON THE SIDE OF THE BED AND HAD REPOSITIONED HERSELF BACK
IN THE BED. I REPLIED THAT SHE NEEDS TO TRY AND KEEP THAT INDEPENDENCE, SHE
REPLIED [facility] YOU ARE RIGHT, THEY WON'T LET ME GO HOME IF I DON'T.  PT QUICKLY
COVERED HERSELF UP. I DID ASSIST HER REACHING WATER FOR MEDICATIONS. SHE AT
95% OF HER DINNER TRAY SELF FED AFTER TRAY WAS SET UP FOR HER.

## 2021-09-24 NOTE — NUR
Pt called for assistance to the bsc and back to the bed. Pt asked for me to
tuck her in with blankets under her chin, I instructed her to adjust the
covers for her own preference, she did this herself. I talked with her about
retaining as much independence as she possibly can and she replied "oh yeah
you told me I need to do that if I plan on going home." She quickly fixed her
covers.  V/s are stable at this time.

## 2021-09-24 NOTE — NUR
PT RESTING IN BED.  PT DENIES PAIN AT THIS TIME.  VS AND ASSESSMENT COMPLETE.
LUNG SOUNDS CLEAR. IV INTACT AND PATENT.  IV FLUIDS INFUSING.  PT AMBULATED TO
BATHROOM WITHOUT INCIDENT. SAFETY PRECAUTIONS IN PLACE.  CALL LIGHT WITHIN
PATIENT REACH  WILL MONITOR CLOSELY

## 2021-09-24 NOTE — NUR
Pt awake and asking for assistance to bsc. Call light was used. I assisted her
to bsc and back to the bed. pt does have  a pad for stress incontinence. Pt
decided she wanted to sit on side of the bed to eat dinner, she stated that
they bring it at 530 and it is too early. I set her up with her dinner tray
and prepped her food for her. Left sitting on side of the bed eating with call
light next to her w/in reach.

## 2021-09-25 VITALS — DIASTOLIC BLOOD PRESSURE: 73 MMHG | SYSTOLIC BLOOD PRESSURE: 137 MMHG

## 2021-09-25 VITALS — SYSTOLIC BLOOD PRESSURE: 155 MMHG | DIASTOLIC BLOOD PRESSURE: 62 MMHG

## 2021-09-25 VITALS — SYSTOLIC BLOOD PRESSURE: 125 MMHG | DIASTOLIC BLOOD PRESSURE: 47 MMHG

## 2021-09-25 VITALS — SYSTOLIC BLOOD PRESSURE: 110 MMHG | DIASTOLIC BLOOD PRESSURE: 46 MMHG

## 2021-09-25 VITALS — DIASTOLIC BLOOD PRESSURE: 59 MMHG | SYSTOLIC BLOOD PRESSURE: 133 MMHG

## 2021-09-25 VITALS — SYSTOLIC BLOOD PRESSURE: 123 MMHG | DIASTOLIC BLOOD PRESSURE: 72 MMHG

## 2021-09-25 VITALS — SYSTOLIC BLOOD PRESSURE: 109 MMHG | DIASTOLIC BLOOD PRESSURE: 43 MMHG

## 2021-09-25 VITALS — DIASTOLIC BLOOD PRESSURE: 52 MMHG | SYSTOLIC BLOOD PRESSURE: 126 MMHG

## 2021-09-25 VITALS — DIASTOLIC BLOOD PRESSURE: 48 MMHG | SYSTOLIC BLOOD PRESSURE: 119 MMHG

## 2021-09-25 VITALS — DIASTOLIC BLOOD PRESSURE: 55 MMHG | SYSTOLIC BLOOD PRESSURE: 151 MMHG

## 2021-09-25 VITALS — SYSTOLIC BLOOD PRESSURE: 141 MMHG | DIASTOLIC BLOOD PRESSURE: 56 MMHG

## 2021-09-25 VITALS — DIASTOLIC BLOOD PRESSURE: 55 MMHG | SYSTOLIC BLOOD PRESSURE: 139 MMHG

## 2021-09-25 VITALS — DIASTOLIC BLOOD PRESSURE: 46 MMHG | SYSTOLIC BLOOD PRESSURE: 112 MMHG

## 2021-09-25 LAB
ALBUMIN SERPL-MCNC: 2.5 G/DL (ref 3.2–5)
ALP SERPL-CCNC: 51 U/L (ref 38–126)
ANION GAP SERPL CALCULATED.3IONS-SCNC: 8 MMOL/L
AST SERPL-CCNC: 31 U/L (ref 9–36)
BASOPHILS NFR BLD AUTO: 0 % (ref 0–3)
BUN SERPL-MCNC: 37 MG/DL (ref 8–23)
BUN/CREAT SERPL: 41
CHLORIDE SERPL-SCNC: 106 MMOL/L (ref 95–108)
CO2 SERPL-SCNC: 26 MMOL/L (ref 22–30)
CREAT SERPL-MCNC: 0.9 MG/DL (ref 0.5–1)
EOSINOPHIL NFR BLD AUTO: 0 % (ref 0–8)
ERYTHROCYTE [DISTWIDTH] IN BLOOD BY AUTOMATED COUNT: 14.1 % (ref 11.5–15.5)
HCT VFR BLD AUTO: 43.5 % (ref 37–47)
HGB BLD-MCNC: 13.8 G/DL (ref 12–16)
IMM GRANULOCYTES NFR BLD: 0.8 % (ref 0–5)
LYMPHOCYTES NFR BLD: 4 % (ref 15–41)
MCH RBC QN AUTO: 31.7 PG  CALC (ref 26–32)
MCHC RBC AUTO-ENTMCNC: 31.7 G/DL CAL (ref 32–36)
MCV RBC AUTO: 100 FL  CALC (ref 80–100)
MONOCYTES NFR BLD AUTO: 8 % (ref 2–13)
NEUTROPHILS # BLD AUTO: 11.43 THOU/UL (ref 2–7.15)
NEUTROPHILS NFR BLD AUTO: 88 % (ref 42–76)
PLATELET # BLD AUTO: 282 THOU/UL (ref 130–400)
POTASSIUM SERPL-SCNC: 4.6 MMOL/L (ref 3.5–5.1)
PROT SERPL-MCNC: 5.5 G/DL (ref 6.3–8.2)
RBC # BLD AUTO: 4.35 MILL/UL (ref 4.2–5.6)
SODIUM SERPL-SCNC: 136 MMOL/L (ref 137–146)

## 2021-09-25 NOTE — NUR
pt called for assistance going to bsc. Pt navigated it well w/out much
assistance.  Pt left back in the bed with lights off and tv on. She is
watching the news.

## 2021-09-25 NOTE — NUR
Pt called for assistance to bsc. Assisted pt and back to bed. Assisted
w/rayna-care and stress incont pad replacement. Assisted her back to bed and
positioning for comfort.

## 2021-09-25 NOTE — NUR
PT CALLED FOR ASSISTANCE TO BSC. ASSISTED BACK TO THE BED 300CC OF DARK CLEAR
YELLOW URINE. V/S ASSESSED AT THIS TIME. OXYGEN SAT LEVELS DROP UPON
AMBULATING TO BSC DOWN TO 86% ON NC 15L HIGH FLOW. UPON RETURNING TO BED SHE
RECOVERED TO 90%.

## 2021-09-25 NOTE — NUR
PATIENT IS A/O X3, ABLE TO MAKE NEEDS KNOWN TO STAFF. STATES SHE IS
BLIND IN BOTH EYES DUE TO MD. BREAKFAST AT BEDSIDE SET UP FOR HER "THE WAY i
LIKE IT" IS WHAT THE PATIENT STATED. APPEARS TO BE HAPPY, SMILES OFTEN. DENIES
PAIN AT THIS TIME. CLEAR TO DIMINIHSED LUNG SOUNDS. NORMAL SINUS RYTHEM ON
MONITOR. STABLE VITAL SIGNS. ACTIVE BOWEL SOUNDS. SOFT NON TENDER ABDOMEN.
EDEMA ON BILATERAL LOWER LEGS 1/2+. STRONG PULSES. ABLE TO SIT UP ON SIDE OF
BED HERSELF TO EAT HER BREAKFAST. SAFETY MEASURES IN PLACE. CALL LIGHT IN
REACH. WILL COTNINUE TO MONITOR PER HOSPITAL'S POLICY.

## 2021-09-26 VITALS — SYSTOLIC BLOOD PRESSURE: 127 MMHG | DIASTOLIC BLOOD PRESSURE: 67 MMHG

## 2021-09-26 VITALS — SYSTOLIC BLOOD PRESSURE: 152 MMHG | DIASTOLIC BLOOD PRESSURE: 57 MMHG

## 2021-09-26 VITALS — SYSTOLIC BLOOD PRESSURE: 150 MMHG | DIASTOLIC BLOOD PRESSURE: 51 MMHG

## 2021-09-26 VITALS — DIASTOLIC BLOOD PRESSURE: 55 MMHG | SYSTOLIC BLOOD PRESSURE: 125 MMHG

## 2021-09-26 VITALS — SYSTOLIC BLOOD PRESSURE: 118 MMHG | DIASTOLIC BLOOD PRESSURE: 51 MMHG

## 2021-09-26 VITALS — DIASTOLIC BLOOD PRESSURE: 47 MMHG | SYSTOLIC BLOOD PRESSURE: 131 MMHG

## 2021-09-26 VITALS — SYSTOLIC BLOOD PRESSURE: 144 MMHG | DIASTOLIC BLOOD PRESSURE: 54 MMHG

## 2021-09-26 VITALS — SYSTOLIC BLOOD PRESSURE: 137 MMHG | DIASTOLIC BLOOD PRESSURE: 54 MMHG

## 2021-09-26 VITALS — DIASTOLIC BLOOD PRESSURE: 61 MMHG | SYSTOLIC BLOOD PRESSURE: 150 MMHG

## 2021-09-26 VITALS — DIASTOLIC BLOOD PRESSURE: 52 MMHG | SYSTOLIC BLOOD PRESSURE: 139 MMHG

## 2021-09-26 VITALS — DIASTOLIC BLOOD PRESSURE: 58 MMHG | SYSTOLIC BLOOD PRESSURE: 146 MMHG

## 2021-09-26 VITALS — SYSTOLIC BLOOD PRESSURE: 109 MMHG | DIASTOLIC BLOOD PRESSURE: 43 MMHG

## 2021-09-26 VITALS — SYSTOLIC BLOOD PRESSURE: 133 MMHG | DIASTOLIC BLOOD PRESSURE: 52 MMHG

## 2021-09-26 VITALS — DIASTOLIC BLOOD PRESSURE: 49 MMHG | SYSTOLIC BLOOD PRESSURE: 154 MMHG

## 2021-09-26 VITALS — DIASTOLIC BLOOD PRESSURE: 48 MMHG | SYSTOLIC BLOOD PRESSURE: 134 MMHG

## 2021-09-26 VITALS — SYSTOLIC BLOOD PRESSURE: 136 MMHG | DIASTOLIC BLOOD PRESSURE: 53 MMHG

## 2021-09-26 VITALS — DIASTOLIC BLOOD PRESSURE: 88 MMHG | SYSTOLIC BLOOD PRESSURE: 130 MMHG

## 2021-09-26 VITALS — SYSTOLIC BLOOD PRESSURE: 133 MMHG | DIASTOLIC BLOOD PRESSURE: 70 MMHG

## 2021-09-26 VITALS — DIASTOLIC BLOOD PRESSURE: 55 MMHG | SYSTOLIC BLOOD PRESSURE: 139 MMHG

## 2021-09-26 VITALS — SYSTOLIC BLOOD PRESSURE: 96 MMHG | DIASTOLIC BLOOD PRESSURE: 45 MMHG

## 2021-09-26 VITALS — DIASTOLIC BLOOD PRESSURE: 53 MMHG | SYSTOLIC BLOOD PRESSURE: 138 MMHG

## 2021-09-26 LAB
ALBUMIN SERPL-MCNC: 3.1 G/DL (ref 3.2–5)
ALP SERPL-CCNC: 86 U/L (ref 38–126)
ANION GAP SERPL CALCULATED.3IONS-SCNC: 11 MMOL/L
AST SERPL-CCNC: 38 U/L (ref 9–36)
BASOPHILS NFR BLD AUTO: 0 % (ref 0–3)
BUN SERPL-MCNC: 39 MG/DL (ref 8–23)
BUN/CREAT SERPL: 37
CHLORIDE SERPL-SCNC: 104 MMOL/L (ref 95–108)
CO2 SERPL-SCNC: 26 MMOL/L (ref 22–30)
CREAT SERPL-MCNC: 1.1 MG/DL (ref 0.5–1)
CRP SERPL-MCNC: 1 MG/DL (ref 0–0.9)
EOSINOPHIL NFR BLD AUTO: 0 % (ref 0–8)
ERYTHROCYTE [DISTWIDTH] IN BLOOD BY AUTOMATED COUNT: 14.2 % (ref 11.5–15.5)
HCT VFR BLD AUTO: 48.3 % (ref 37–47)
HGB BLD-MCNC: 15.4 G/DL (ref 12–16)
IMM GRANULOCYTES NFR BLD: 1.5 % (ref 0–5)
LYMPHOCYTES NFR BLD: 5 % (ref 15–41)
MCH RBC QN AUTO: 31.8 PG  CALC (ref 26–32)
MCHC RBC AUTO-ENTMCNC: 31.9 G/DL CAL (ref 32–36)
MCV RBC AUTO: 99.6 FL  CALC (ref 80–100)
MONOCYTES NFR BLD AUTO: 8 % (ref 2–13)
NEUTROPHILS # BLD AUTO: 16.99 THOU/UL (ref 2–7.15)
NEUTROPHILS NFR BLD AUTO: 85 % (ref 42–76)
PLATELET # BLD AUTO: 332 THOU/UL (ref 130–400)
POTASSIUM SERPL-SCNC: 4.6 MMOL/L (ref 3.5–5.1)
PROT SERPL-MCNC: 6.2 G/DL (ref 6.3–8.2)
RBC # BLD AUTO: 4.85 MILL/UL (ref 4.2–5.6)
SODIUM SERPL-SCNC: 137 MMOL/L (ref 137–146)

## 2021-09-26 NOTE — NUR
PT CALLED ASKING FOR MONITOR BEEPING TO BE TURNED OFF. I ENTERED THE ROOM TO
REPLACE MONITOR AND EXPLAINED TO HER THAT WE HAVE THE ALARMS FOR A PURPOSE OF
MONITORING HER SATS. SHE STATED "WELL YOU JUST GAVE ME A SLEEPING PILL AND I
AM NOT ASLEEP YET."  I ASSURED HER THAT THE SLEEPING PILL TAKES A FEW MINUTES
TO HELP AND MAYBE TRY TURNING THE TV OFF TO HELP HER GO TO SLEEP. NO S/O
DISTRESS AT THIS TIME. CALL LIGHT IN HAND.

## 2021-09-26 NOTE — NUR
PATIENT STILL DOESN'T WANT TO LISTEN TO HER AUDIOBOOKS, OFFERED THE AUDIOBOOKS
PER SON'S REQUEST. AUDIOBOOKS REMAIN IN THE CORNER OF HER ROOM PER PATIENT'S
REQUEST.

## 2021-09-26 NOTE — NUR
PATIENT IS A/O X3, ABLE TO MAKE NEEDS KNOWN TO STAFF. DENIES PAIN AT THIS
TIME. USED THE BEDSIDE COMMODE ON HER OWN WITH NO ISSUES. 2MM PERRLA BILATERAL
EYES. SPEECH IS CLEAR. SINUS PEETR ON THE MONITOR. ON 12L O2 HFNC SATS ARE
94%. WILL TRY TO TITRATE DOWN DURING THE SHIFT. CLEAR LUNG SOUNDS. ACTIVE
BOWEL SOUNDS. SOFT OBESE ABDOMEN. TRACE EDEMA IN BLE. STRONG PULSES. STRONG
AND QUAL HAND . NO DRIFTS IN ARMS OR LEGS NOTED. VITALS ARE STABLE.
BREAKFAST ON SIDE TABLE, SET UP AND EXPLAINED TO HER WHATS THERE. SAFETY
MEASURES IN PLACE. CALL LIGHT IN REACH. WILL CONTINUE TO MONITOR PER
HOSPITAL'S POLICY.

## 2021-09-26 NOTE — NUR
Pt assisted to bsc and back to the bed. 90% 02sat on 12LNC High Flow. Resp
even and non-labored. Pt tolerated pivoting to bsc and back to bed well. Subjective:     General surgery service was consulted by Taylor Oviedo PA-C for evaluation of this patient with abdominal pain.     Patient is a 68 year old year old female presents with abdominal pain. Onset of symptoms was abrupt starting 8 hours ago that has been slightly improved since that time. The pain is located lower abdomen. Patient describes the pain as continuous and rated as severe. The associated symptoms include constipation. Patient denies fevers, chills, diarrhea, vomiting, nausea, or a cough. Symptoms are aggravated by palpation. Symptoms improve with rest.  Previous studies include colonoscopy. She had a colonoscopy on 2020 which was negative but 8 cm mass of right lower quadrant on CT No history of similar symptoms but a history of constipation. Last BM was 20 and was passing flatus. She has a history of a pelvic mass that is she being see for at [facility]. History of hysterectomy, C section, bladder suspension, salping oophorectomy, omental biopsy, and lymph node sampling. She denies alcohol use, illicit drug use, and cigarette smoking.    Patient Active Problem List   Diagnosis   • Pelvic mass in female   • Granulosa cell tumor of ovary, left         Past Medical History:   Diagnosis Date   • Abdominal pain    • Diabetes mellitus (CMS/HCC)    • Essential (primary) hypertension    • Gastritis    • Gastroesophageal reflux disease    • Granulosa cell carcinoma of ovary, left (CMS/HCC)    • H. pylori infection    • PONV (postoperative nausea and vomiting)    • Preseptal cellulitis of right eye    • Wears glasses        Past Surgical History:   Procedure Laterality Date   • Bladder suspension      secondary to prolapse   •  section, classic     • Colonoscopy diagnostic  2020    3yr recall due to adenoma polyp removed    • Egd  2020   • Esophagogastroduodenoscopy  2017    + h.pylori/gastritis   • Hysterectomy      vaginal secondary to prolapse   •  Lymphadenectomy  02/26/2017    [facility], Dr. Sia Sigala, pelvic lymphadenectomy   • Omentectomy  02/26/2017    [facility], Dr. Carolin Sigala, Omental biopsy   • Salpingoophorectomy Bilateral 02/26/2017    [facility], Dr. Sia Sigala       No current facility-administered medications for this encounter.      Current Outpatient Medications   Medication Sig Dispense Refill   • polyethylene glycol (MiraLax) 17 GM/SCOOP powder Take 17 g by mouth as needed. Indications: Constipation Stir and dissolve powder in any 4 to 8 ounces of beverage, then drink.     • acetaminophen (Tylenol 8 Hour Arthritis Pain) 650 MG CR tablet Take 650 mg by mouth every 8 hours as needed for Pain.     • amLODIPine (NORVASC) 5 MG tablet Take 5 mg by mouth daily.     • metFORMIN (GLUCOPHAGE) 500 MG tablet Take 500 mg by mouth 2 times daily (with meals).     • omeprazole (PriLOSEC) 40 MG capsule Take 40 mg by mouth daily.          ALLERGIES:  No Known Allergies    No family history on file.  History reviewed. See above pertinent family history.     Review of Systems  CONSTITUTIONAL: Denies - subjective fever, chills and Positive for- weight loss.   HEENT:Denies - visual loss, hearing loss and congestion.   SKIN: Denies - rash and itching.  CARDIOVASCULAR: Denies - chest pain and palpitations.   RESPIRATORY: Denies - shortness of breath and cough.   GASTROINTESTINAL: Denies - as per HPI.   GENITOURINARY: Denies - dysuria and incontinence.   NEUROLOGICAL: Denies - headache and dizziness.   HEMATOLOGIC: Denies - bleeding and bruising.   PSYCHIATRIC: Denies - anxiety and depression.         Objective:     Vitals:    01/06/21 1243   BP:    Pulse: 90   Resp: (!) 23   Temp:        No intake or output data in the 24 hours ending 01/06/21 1322      General Appearance:    Alert, cooperative, no distress, appears stated age   Head:    Normocephalic, without obvious abnormality, atraumatic   Eyes:    Pupils equal, round, reactive to light,  conjunctivae/corneas clear, extraocular movements intact   Ears:    Normal external ear canals, both ears   Nose:   Nares patent   Throat:   Lips, mucosa, and tongue normal;    Neck:   Supple, symmetrical, trachea midline,       Lungs:     Clear to auscultation bilaterally, respirations unlabored   Chest Wall:    No tenderness or deformity    Heart:    Regular rate and rhythm, S1 and S2 normal, no murmur, rub   or gallop   Abdomen:     Soft, Minimal-tender, bowel sounds active all four quadrants, non distended, firm small mass palpated on RLQ abdominal tissue    no masses, no organomegaly   Extremities:   Extremities normal, atraumatic, no cyanosis or edema   Pulses:   2+ and symmetric all extremities,    Skin:   Skin color, texture, turgor normal, no rashes or lesions   Neurologic:   Cranial nerves II-XII intact, normal strength, sensation and reflexes throughout       Data Review:   WBC (K/mcL)   Date Value   01/06/2021 8.6   10/27/2017 5.9     RBC (mil/mcL)   Date Value   01/06/2021 5.05   10/27/2017 4.84     HCT (%)   Date Value   01/06/2021 43.4   10/27/2017 41.9     HGB (g/dL)   Date Value   01/06/2021 14.1   10/27/2017 13.8     PLT (K/mcL)   Date Value   01/06/2021 163   10/27/2017 173   02/10/2013 125 (L)     Sodium (mmol/L)   Date Value   01/06/2021 138   10/27/2017 142     Potassium (mmol/L)   Date Value   01/06/2021 3.9   10/27/2017 3.7     Chloride (mmol/L)   Date Value   01/06/2021 101   10/27/2017 105     Glucose (mg/dL)   Date Value   01/06/2021 103 (H)   10/27/2017 103 (H)     CALCIUM (mg/dL)   Date Value   10/27/2017 8.7     Calcium (mg/dL)   Date Value   01/06/2021 9.5     Carbon Dioxide (mmol/L)   Date Value   01/06/2021 29   10/27/2017 27     BUN (mg/dL)   Date Value   01/06/2021 13   10/27/2017 14     Creatinine (mg/dL)   Date Value   01/06/2021 0.60   10/27/2017 0.70     No results found for: INR     Ref Range & Units 4mo ago   CEA 0.0 - 5.0 ng/mL 0.7       Ref Range & Units 4mo ago   Cancer  Antigen 19-9 0 - 35 Units/mL 7       Ref Range & Units 4mo ago   Cancer Antigen 125 0 - 35 Units/mL 6          Imaging    Colonoscopy   Pathologic Diagnosis   A.   Stomach, biopsies:  -Mild chronic antral gastritis  -Immunohistochemical stain negative for Helicobacter pylori     B.   Colon, cecum, polypectomy:  -Minute tubular adenoma     C.   Colon, descending, polypectomy:  -Serrated polyp, favor sessile serrated adenoma         8/30/20 CT Abdomen Pelvis IMPRESSION:      1.  Recurrent, 8 cm heterogenous mass within the right low pelvis which is  favored to reflect recurrent malignancy.  2.  Enhancing nodule along the right anterior abdominal wall which is concerning for metastatic implant.  3.  Mild amount of hyperdense free fluid within the pelvis consistent with  mild hemoperitoneum.  4.  There is wall thickening involving the distal stomach, duodenum, and  proximal right colon. This could be inflammatory or reactive in nature  although is nonspecific. Mild enteritis/colitis is not excluded. Wall  infiltrating process could present similarly although given the length of  the segments is considered less likely.  5.  Other incidental findings as discussed.    CT Abdomen Pelvis IMPRESSION:  Early small bowel obstruction with transition zone seen in the  right lower quadrant of the abdomen as outlined above  2. Previous described large mass in the right side of the pelvis is not present on this exam there may be a 3 cm in diameter cystic mass in the right side of the pelvis is outlined above  3. Slight thickening of the antrum of the stomach is again noted as  described on the previous exam.         Assessment:     SBO- Ct with possible obstruction near pelvic mass. Hx of constipation. Had lap salpingo oophorectomy in 2017. Abdomen soft with minimal tenderness.      Right pelvic mass in soft tissue- near transition point on CT. New.       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     Plan:     • NPO  • Xray 2 view tomorrow  • Gyn consult   • No surgical intervention    Constantin ACEVEDO  General Vascular Surgery  Pager [phone]    Previous vaginal hysterectomy.  2017 removal of granulosa cell tumor done laparoscopically.  Doing well until 3:00 a.m. this morning.  Currently patient is awake alert.  Vital signs stable.  Patient looks comfortable.  Examination is overall unremarkable without evidence of rebound or guarding.  Few bowel sounds but no high-pitched bowel sounds or rushes.  CT scan from 2017, August 2020 and today are reviewed and compared.  Had recent EGD and colonoscopy.    Possible early partial small bowel obstruction.    Will treat conservatively with NPO and IV fluids.  Repeat x-rays in a.m..  Consider follow-up gyn consultation.  Question repeat tumor markers.    If goes to surgery will ask Urology to place ureteral stents.

## 2021-09-27 VITALS — SYSTOLIC BLOOD PRESSURE: 155 MMHG | DIASTOLIC BLOOD PRESSURE: 74 MMHG

## 2021-09-27 VITALS — SYSTOLIC BLOOD PRESSURE: 145 MMHG | DIASTOLIC BLOOD PRESSURE: 67 MMHG

## 2021-09-27 VITALS — DIASTOLIC BLOOD PRESSURE: 55 MMHG | SYSTOLIC BLOOD PRESSURE: 126 MMHG

## 2021-09-27 VITALS — DIASTOLIC BLOOD PRESSURE: 77 MMHG | SYSTOLIC BLOOD PRESSURE: 139 MMHG

## 2021-09-27 VITALS — DIASTOLIC BLOOD PRESSURE: 60 MMHG | SYSTOLIC BLOOD PRESSURE: 146 MMHG

## 2021-09-27 VITALS — DIASTOLIC BLOOD PRESSURE: 53 MMHG | SYSTOLIC BLOOD PRESSURE: 134 MMHG

## 2021-09-27 VITALS — DIASTOLIC BLOOD PRESSURE: 61 MMHG | SYSTOLIC BLOOD PRESSURE: 144 MMHG

## 2021-09-27 VITALS — DIASTOLIC BLOOD PRESSURE: 75 MMHG | SYSTOLIC BLOOD PRESSURE: 178 MMHG

## 2021-09-27 VITALS — SYSTOLIC BLOOD PRESSURE: 180 MMHG | DIASTOLIC BLOOD PRESSURE: 73 MMHG

## 2021-09-27 VITALS — DIASTOLIC BLOOD PRESSURE: 57 MMHG | SYSTOLIC BLOOD PRESSURE: 156 MMHG

## 2021-09-27 VITALS — DIASTOLIC BLOOD PRESSURE: 56 MMHG | SYSTOLIC BLOOD PRESSURE: 155 MMHG

## 2021-09-27 VITALS — DIASTOLIC BLOOD PRESSURE: 94 MMHG | SYSTOLIC BLOOD PRESSURE: 132 MMHG

## 2021-09-27 VITALS — SYSTOLIC BLOOD PRESSURE: 131 MMHG | DIASTOLIC BLOOD PRESSURE: 62 MMHG

## 2021-09-27 VITALS — DIASTOLIC BLOOD PRESSURE: 87 MMHG | SYSTOLIC BLOOD PRESSURE: 151 MMHG

## 2021-09-27 VITALS — SYSTOLIC BLOOD PRESSURE: 154 MMHG | DIASTOLIC BLOOD PRESSURE: 69 MMHG

## 2021-09-27 VITALS — SYSTOLIC BLOOD PRESSURE: 143 MMHG | DIASTOLIC BLOOD PRESSURE: 61 MMHG

## 2021-09-27 VITALS — SYSTOLIC BLOOD PRESSURE: 135 MMHG | DIASTOLIC BLOOD PRESSURE: 50 MMHG

## 2021-09-27 VITALS — DIASTOLIC BLOOD PRESSURE: 61 MMHG | SYSTOLIC BLOOD PRESSURE: 135 MMHG

## 2021-09-27 VITALS — SYSTOLIC BLOOD PRESSURE: 136 MMHG | DIASTOLIC BLOOD PRESSURE: 60 MMHG

## 2021-09-27 VITALS — SYSTOLIC BLOOD PRESSURE: 158 MMHG | DIASTOLIC BLOOD PRESSURE: 87 MMHG

## 2021-09-27 VITALS — DIASTOLIC BLOOD PRESSURE: 56 MMHG | SYSTOLIC BLOOD PRESSURE: 146 MMHG

## 2021-09-27 LAB
ALBUMIN SERPL-MCNC: 2.7 G/DL (ref 3.2–5)
ALP SERPL-CCNC: 54 U/L (ref 38–126)
ANION GAP SERPL CALCULATED.3IONS-SCNC: 8 MMOL/L
AST SERPL-CCNC: 30 U/L (ref 9–36)
BUN SERPL-MCNC: 38 MG/DL (ref 8–23)
BUN/CREAT SERPL: 48
CHLORIDE SERPL-SCNC: 104 MMOL/L (ref 95–108)
CO2 SERPL-SCNC: 28 MMOL/L (ref 22–30)
CREAT SERPL-MCNC: 0.8 MG/DL (ref 0.5–1)
ERYTHROCYTE [DISTWIDTH] IN BLOOD BY AUTOMATED COUNT: 14.3 % (ref 11.5–15.5)
HCT VFR BLD AUTO: 44.3 % (ref 37–47)
HGB BLD-MCNC: 14 G/DL (ref 12–16)
MCH RBC QN AUTO: 31.5 PG  CALC (ref 26–32)
MCHC RBC AUTO-ENTMCNC: 31.6 G/DL CAL (ref 32–36)
MCV RBC AUTO: 99.8 FL  CALC (ref 80–100)
PLATELET # BLD AUTO: 245 THOU/UL (ref 130–400)
POTASSIUM SERPL-SCNC: 4.6 MMOL/L (ref 3.5–5.1)
PROT SERPL-MCNC: 5.5 G/DL (ref 6.3–8.2)
RBC # BLD AUTO: 4.44 MILL/UL (ref 4.2–5.6)
SODIUM SERPL-SCNC: 135 MMOL/L (ref 137–146)

## 2021-09-27 NOTE — NUR
Pt was sleeping, awoke to my being in the room. v/s assessed, 02 sats 97%, 02
titrated down to 6LNC High Flow. Pt denied any needs, Lab is in with pt at
this time.

## 2021-09-27 NOTE — NUR
PT NOTED RESTING IN BED. WAKES EASILY. A&OX3. NO APPARENT DISTRESS NOTED.
MEDICATED AS ORDERED. PT DENIES ANY CURRENT WANTS OR NEEDS AT THIS TIME. CALL
LIGHT WITHIN REACH. WILL CONTINUE TO MONITOR.

## 2021-09-27 NOTE — NUR
Patient participated with PT treatment protocol today.  Patient participated
with log rolling bed mobility ADLs and sit to stand push off transfers with 1
to 2 reps with occasional verbal and tactile cuing to help decrease trick
movement and fall risks.  Patient did gait ADLs for 5 to 8 feet x 2 reps with
supervision on level surfaces with occasional verbal and tactile cuing to help
decrease fall risks.  Patient also did seated B LE AROM exercises doing hip
flexion, hip adduction, hip abduction, hamstring curls, knee extension, and
ankle pumps for 15 reps x 2 sets with occasional verbal and tactile cuing to
help decrease trick movements and fall risks as patient prepares for discharge
to home.

## 2021-09-27 NOTE — NUR
Pt appears to be sleeping, no s/o distress at this time. )2 sats stable @94%
on 8LNC High Flow. Resp even and non-labored.

## 2021-09-28 VITALS — DIASTOLIC BLOOD PRESSURE: 59 MMHG | SYSTOLIC BLOOD PRESSURE: 154 MMHG

## 2021-09-28 VITALS — SYSTOLIC BLOOD PRESSURE: 127 MMHG | DIASTOLIC BLOOD PRESSURE: 59 MMHG

## 2021-09-28 VITALS — SYSTOLIC BLOOD PRESSURE: 142 MMHG | DIASTOLIC BLOOD PRESSURE: 72 MMHG

## 2021-09-28 VITALS — DIASTOLIC BLOOD PRESSURE: 55 MMHG | SYSTOLIC BLOOD PRESSURE: 138 MMHG

## 2021-09-28 VITALS — SYSTOLIC BLOOD PRESSURE: 124 MMHG | DIASTOLIC BLOOD PRESSURE: 58 MMHG

## 2021-09-28 VITALS — DIASTOLIC BLOOD PRESSURE: 60 MMHG | SYSTOLIC BLOOD PRESSURE: 128 MMHG

## 2021-09-28 VITALS — DIASTOLIC BLOOD PRESSURE: 55 MMHG | SYSTOLIC BLOOD PRESSURE: 135 MMHG

## 2021-09-28 VITALS — SYSTOLIC BLOOD PRESSURE: 148 MMHG | DIASTOLIC BLOOD PRESSURE: 61 MMHG

## 2021-09-28 VITALS — DIASTOLIC BLOOD PRESSURE: 56 MMHG | SYSTOLIC BLOOD PRESSURE: 140 MMHG

## 2021-09-28 VITALS — SYSTOLIC BLOOD PRESSURE: 121 MMHG | DIASTOLIC BLOOD PRESSURE: 54 MMHG

## 2021-09-28 VITALS — SYSTOLIC BLOOD PRESSURE: 100 MMHG | DIASTOLIC BLOOD PRESSURE: 42 MMHG

## 2021-09-28 VITALS — SYSTOLIC BLOOD PRESSURE: 125 MMHG | DIASTOLIC BLOOD PRESSURE: 58 MMHG

## 2021-09-28 VITALS — SYSTOLIC BLOOD PRESSURE: 112 MMHG | DIASTOLIC BLOOD PRESSURE: 52 MMHG

## 2021-09-28 VITALS — SYSTOLIC BLOOD PRESSURE: 83 MMHG | DIASTOLIC BLOOD PRESSURE: 60 MMHG

## 2021-09-28 VITALS — SYSTOLIC BLOOD PRESSURE: 104 MMHG | DIASTOLIC BLOOD PRESSURE: 60 MMHG

## 2021-09-28 LAB
ANION GAP SERPL CALCULATED.3IONS-SCNC: 7 MMOL/L
BUN SERPL-MCNC: 36 MG/DL (ref 8–23)
BUN/CREAT SERPL: 34
CHLORIDE SERPL-SCNC: 101 MMOL/L (ref 95–108)
CO2 SERPL-SCNC: 31 MMOL/L (ref 22–30)
CREAT SERPL-MCNC: 1.1 MG/DL (ref 0.5–1)
ERYTHROCYTE [DISTWIDTH] IN BLOOD BY AUTOMATED COUNT: 14.1 % (ref 11.5–15.5)
HCT VFR BLD AUTO: 48.8 % (ref 37–47)
HGB BLD-MCNC: 15.5 G/DL (ref 12–16)
MAGNESIUM SERPL-MCNC: 2.2 MG/DL (ref 1.6–2.3)
MCH RBC QN AUTO: 31.6 PG  CALC (ref 26–32)
MCHC RBC AUTO-ENTMCNC: 31.8 G/DL CAL (ref 32–36)
MCV RBC AUTO: 99.4 FL  CALC (ref 80–100)
PLATELET # BLD AUTO: 214 THOU/UL (ref 130–400)
POTASSIUM SERPL-SCNC: 5 MMOL/L (ref 3.5–5.1)
RBC # BLD AUTO: 4.91 MILL/UL (ref 4.2–5.6)
SODIUM SERPL-SCNC: 134 MMOL/L (ref 137–146)

## 2021-09-28 NOTE — NUR
Patient participated with PT intervention today.  Patient carried out log
rolling bed mobility and sit to stand push off transfers with 1 to 3 reps with
supervision to help decrease trick movements and fall risks.  Patient did B LE
AROM exercises doing hip flexion, hip adduction, hip abduction, hamstring
curls, knee extension, and ankle pumps for 15 reps x 2 sets with occasional
verbal and tactile cuing to help improve capability to carry out increased
functional weight bearing ADLs while decreasing fall risks.

## 2021-09-28 NOTE — NUR
PT REQUESTING SOMETHING TO HELP HER SLEEP. PT OFFERED SONATA AT THIS TIME. PT
REFUSED AND REQUESTED XANAX. ADMINISTERED AS ORDERED. PT SLIGHTLY ANXIOUS. NO
APPARENT DISTRESS NOTED. VSS. PT DENIES ANY OTHER CURRENT WANTS OR NEEDS. CALL
LIGHT WITHIN REACH. WILL CONTINUE TO MONITOR.

## 2022-05-09 NOTE — NUR
PT MEDICATED AS ORDERS PROVIDE AND PT ASSISTED TO BSC. I ALSO ASSISTED WITH PO
FLUIDS AND EATING PUDDING. CALL LIGHT AT SIDE. Declined

## 2022-08-23 ENCOUNTER — APPOINTMENT (RX ONLY)
Dept: URBAN - NONMETROPOLITAN AREA CLINIC 10 | Facility: CLINIC | Age: 77
Setting detail: DERMATOLOGY
End: 2022-08-23

## 2022-08-23 VITALS — HEART RATE: 65 BPM | DIASTOLIC BLOOD PRESSURE: 65 MMHG | TEMPERATURE: 97.9 F | SYSTOLIC BLOOD PRESSURE: 130 MMHG

## 2022-08-23 DIAGNOSIS — L57.0 ACTINIC KERATOSIS: ICD-10-CM

## 2022-08-23 PROBLEM — C44.612 BASAL CELL CARCINOMA OF SKIN OF RIGHT UPPER LIMB, INCLUDING SHOULDER: Status: ACTIVE | Noted: 2022-08-23

## 2022-08-23 PROCEDURE — 17003 DESTRUCT PREMALG LES 2-14: CPT

## 2022-08-23 PROCEDURE — 17000 DESTRUCT PREMALG LESION: CPT | Mod: 59

## 2022-08-23 PROCEDURE — 17262 DSTRJ MAL LES T/A/L 1.1-2.0: CPT

## 2022-08-23 PROCEDURE — ? CURETTAGE AND DESTRUCTION

## 2022-08-23 PROCEDURE — ? COUNSELING

## 2022-08-23 PROCEDURE — ? LIQUID NITROGEN

## 2022-08-23 ASSESSMENT — LOCATION DETAILED DESCRIPTION DERM
LOCATION DETAILED: LEFT ULNAR DORSAL HAND
LOCATION DETAILED: LEFT VENTRAL DISTAL FOREARM
LOCATION DETAILED: RIGHT VENTRAL DISTAL FOREARM
LOCATION DETAILED: LEFT DISTAL DORSAL FOREARM

## 2022-08-23 ASSESSMENT — LOCATION ZONE DERM
LOCATION ZONE: HAND
LOCATION ZONE: ARM

## 2022-08-23 ASSESSMENT — LOCATION SIMPLE DESCRIPTION DERM
LOCATION SIMPLE: LEFT HAND
LOCATION SIMPLE: LEFT FOREARM
LOCATION SIMPLE: RIGHT FOREARM

## 2022-08-23 NOTE — HPI: PROCEDURE (ELECTRODESSICATION AND CURETTAGE)
Has The Growth Been Previously Biopsied?: has been previously biopsied
Body Location Override (Optional): right anterior medial shoulder

## 2022-08-23 NOTE — PROCEDURE: CURETTAGE AND DESTRUCTION
Body Location Override (Optional - Billing Will Still Be Based On Selected Body Map Location If Applicable): right medial anterior shoulder
Detail Level: Detailed
Biopsy Photograph Reviewed: Yes
Number Of Curettages: 3
Size Of Lesion In Cm: 2
Add Intralesional Injection: No
Concentration (Mg/Ml Or Millions Of Plaque Forming Units/Cc): 0.01
Total Volume (Ccs): 1
Anesthesia Type: 1% lidocaine with epinephrine and a 1:10 solution of 8.4% sodium bicarbonate
Cautery Type: electrodesiccation
What Was Performed First?: Curettage
Final Size Statement: The size of the lesion after curettage was
Additional Information: (Optional): The wound was cleaned, and a pressure dressing was applied. The patient received detailed post-op instructions.
Consent was obtained from the patient. The risks, benefits and alternatives to therapy were discussed in detail. Specifically, the risks of infection, scarring, bleeding, prolonged wound healing, nerve injury, incomplete removal, allergy to anesthesia and recurrence were addressed. Alternatives to [facility], such as: surgical removal and XRT were also discussed. Prior to the procedure, the treatment site was clearly identified and confirmed by the patient. All components of Universal Protocol/PAUSE Rule completed.
Post-Care Instructions: I reviewed with the patient in detail post-care instructions. Patient is to keep the area dry for 48 hours, and not to engage in any swimming until the area is healed. Should the patient develop any fevers, chills, bleeding, severe pain patient will contact the office immediately.
Bill As A Line Item Or As Units: Line Item

## 2022-12-01 ENCOUNTER — APPOINTMENT (RX ONLY)
Dept: URBAN - NONMETROPOLITAN AREA CLINIC 10 | Facility: CLINIC | Age: 77
Setting detail: DERMATOLOGY
End: 2022-12-01

## 2022-12-01 DIAGNOSIS — L57.8 OTHER SKIN CHANGES DUE TO CHRONIC EXPOSURE TO NONIONIZING RADIATION: ICD-10-CM

## 2022-12-01 DIAGNOSIS — Z85.828 PERSONAL HISTORY OF OTHER MALIGNANT NEOPLASM OF SKIN: ICD-10-CM

## 2022-12-01 PROCEDURE — 99213 OFFICE O/P EST LOW 20 MIN: CPT

## 2022-12-01 PROCEDURE — ? COUNSELING

## 2022-12-01 ASSESSMENT — LOCATION DETAILED DESCRIPTION DERM
LOCATION DETAILED: RIGHT ANTERIOR SHOULDER
LOCATION DETAILED: RIGHT CLAVICULAR SKIN

## 2022-12-01 ASSESSMENT — LOCATION ZONE DERM
LOCATION ZONE: ARM
LOCATION ZONE: TRUNK

## 2022-12-01 ASSESSMENT — LOCATION SIMPLE DESCRIPTION DERM
LOCATION SIMPLE: RIGHT CLAVICULAR SKIN
LOCATION SIMPLE: RIGHT SHOULDER

## 2022-12-01 NOTE — HPI: FOLLOW-UP
What Is The Condition That You Are Returning For Follow-Up?: basal cell carcinoma
Additional History: Patient wants re check of right medial anterior shoulder- previously had ED&C
When Was Your Last Appointment?: 8/23/2022
During The Previous Visit, The Patient ....: Had a ED&C performed

## 2023-03-14 ENCOUNTER — APPOINTMENT (RX ONLY)
Dept: URBAN - NONMETROPOLITAN AREA CLINIC 10 | Facility: CLINIC | Age: 78
Setting detail: DERMATOLOGY
End: 2023-03-14

## 2023-03-14 DIAGNOSIS — L85.3 XEROSIS CUTIS: ICD-10-CM

## 2023-03-14 DIAGNOSIS — L82.1 OTHER SEBORRHEIC KERATOSIS: ICD-10-CM

## 2023-03-14 PROBLEM — D04.39 CARCINOMA IN SITU OF SKIN OF OTHER PARTS OF FACE: Status: ACTIVE | Noted: 2023-03-14

## 2023-03-14 PROCEDURE — 11102 TANGNTL BX SKIN SINGLE LES: CPT

## 2023-03-14 PROCEDURE — ? BIOPSY BY SHAVE METHOD

## 2023-03-14 PROCEDURE — 99213 OFFICE O/P EST LOW 20 MIN: CPT | Mod: 25

## 2023-03-14 PROCEDURE — ? COUNSELING

## 2023-03-14 ASSESSMENT — LOCATION SIMPLE DESCRIPTION DERM
LOCATION SIMPLE: LEFT CALF
LOCATION SIMPLE: LEFT FOREARM
LOCATION SIMPLE: RIGHT CALF
LOCATION SIMPLE: LEFT PRETIBIAL REGION
LOCATION SIMPLE: RIGHT PRETIBIAL REGION
LOCATION SIMPLE: CHEST
LOCATION SIMPLE: RIGHT FOREARM

## 2023-03-14 ASSESSMENT — LOCATION ZONE DERM
LOCATION ZONE: TRUNK
LOCATION ZONE: LEG
LOCATION ZONE: ARM

## 2023-03-14 ASSESSMENT — LOCATION DETAILED DESCRIPTION DERM
LOCATION DETAILED: LEFT DISTAL PRETIBIAL REGION
LOCATION DETAILED: LEFT DISTAL CALF
LOCATION DETAILED: LEFT MEDIAL SUPERIOR CHEST
LOCATION DETAILED: RIGHT PROXIMAL CALF
LOCATION DETAILED: RIGHT DISTAL DORSAL FOREARM
LOCATION DETAILED: LEFT PROXIMAL DORSAL FOREARM
LOCATION DETAILED: RIGHT PROXIMAL PRETIBIAL REGION

## 2023-03-14 NOTE — HPI: FOLLOW-UP
What Is The Condition That You Are Returning For Follow-Up?: other
Additional History: Pt refused treatment 9/2022 her today to have re-evaluated.

## 2023-03-14 NOTE — PROCEDURE: BIOPSY BY SHAVE METHOD
Body Location Override (Optional - Billing Will Still Be Based On Selected Body Map Location If Applicable): left lower cheek
Detail Level: Detailed
Depth Of Biopsy: dermis
Was A Bandage Applied: Yes
Size Of Lesion In Cm: 1
Accession #: [id number]
Biopsy Type: H and E
Biopsy Method: sterile single edge surgical blade
Anesthesia Type: 2% Nesacaine
Anesthesia Volume In Cc (Will Not Render If 0): 0.5
Additional Anesthesia Volume In Cc (Will Not Render If 0): 0
Hemostasis: Electrocautery
Wound Care: Aquaphor
Dressing: bandage
Destruction After The Procedure: No
Type Of Destruction Used: Curettage
Curettage Text: The wound bed was treated with curettage after the biopsy was performed.
Cryotherapy Text: The wound bed was treated with cryotherapy after the biopsy was performed.
Electrodesiccation Text: The wound bed was treated with electrodesiccation after the biopsy was performed.
Electrodesiccation And Curettage Text: The wound bed was treated with electrodesiccation and curettage after the biopsy was performed.
Silver Nitrate Text: The wound bed was treated with silver nitrate after the biopsy was performed.
Lab: -[id number]
Path Notes Override (Will Replace All Of The Above Text): Please check M&D. \\nDeep Shave.
Consent: Written consent was obtained and risks were reviewed including but not limited to scarring, infection, bleeding, scabbing, incomplete removal, nerve damage and allergy to anesthesia.
Post-Care Instructions: I reviewed with the patient in detail post-care instructions. Patient is to keep the biopsy site dry overnight, and then apply bacitracin twice daily until healed. Patient may apply hydrogen peroxide soaks to remove any crusting.
Notification Instructions: Patient will be notified of biopsy results. However, patient instructed to call the office if not contacted within 2 weeks.
Billing Type: [employer]
Information: Selecting Yes will display possible errors in your note based on the variables you have selected. This validation is only offered as a suggestion for you. PLEASE NOTE THAT THE VALIDATION TEXT WILL BE REMOVED WHEN YOU FINALIZE YOUR NOTE. IF YOU WANT TO FAX A PRELIMINARY NOTE YOU WILL NEED TO TOGGLE THIS TO 'NO' IF YOU DO NOT WANT IT IN YOUR FAXED NOTE.

## 2023-04-18 ENCOUNTER — APPOINTMENT (RX ONLY)
Dept: URBAN - NONMETROPOLITAN AREA CLINIC 10 | Facility: CLINIC | Age: 78
Setting detail: DERMATOLOGY
End: 2023-04-18

## 2023-04-18 DIAGNOSIS — L82.1 OTHER SEBORRHEIC KERATOSIS: ICD-10-CM

## 2023-04-18 DIAGNOSIS — L82.0 INFLAMED SEBORRHEIC KERATOSIS: ICD-10-CM

## 2023-04-18 PROBLEM — D04.39 CARCINOMA IN SITU OF SKIN OF OTHER PARTS OF FACE: Status: ACTIVE | Noted: 2023-04-18

## 2023-04-18 PROCEDURE — 17282 DSTR MAL LS F/E/E/N/L/M1.1-2: CPT

## 2023-04-18 PROCEDURE — 17110 DESTRUCTION B9 LES UP TO 14: CPT | Mod: 59

## 2023-04-18 PROCEDURE — ? LIQUID NITROGEN

## 2023-04-18 PROCEDURE — ? CURETTAGE AND DESTRUCTION

## 2023-04-18 PROCEDURE — ? COUNSELING

## 2023-04-18 PROCEDURE — 99212 OFFICE O/P EST SF 10 MIN: CPT | Mod: 25

## 2023-04-18 ASSESSMENT — LOCATION ZONE DERM: LOCATION ZONE: LEG

## 2023-04-18 ASSESSMENT — LOCATION SIMPLE DESCRIPTION DERM
LOCATION SIMPLE: LEFT PRETIBIAL REGION
LOCATION SIMPLE: RIGHT POSTERIOR THIGH

## 2023-04-18 ASSESSMENT — LOCATION DETAILED DESCRIPTION DERM
LOCATION DETAILED: RIGHT PROXIMAL LATERAL POSTERIOR THIGH
LOCATION DETAILED: LEFT DISTAL PRETIBIAL REGION

## 2023-04-18 NOTE — PROCEDURE: CURETTAGE AND DESTRUCTION
Body Location Override (Optional - Billing Will Still Be Based On Selected Body Map Location If Applicable): left lower cheek
Detail Level: Detailed
Biopsy Photograph Reviewed: Yes
Accession # (Optional): [id number]
Number Of Curettages: 3
Size Of Lesion In Cm: 1.2
Add Intralesional Injection: No
Concentration (Mg/Ml Or Millions Of Plaque Forming Units/Cc): 0.01
Total Volume (Ccs): 1
Anesthesia Type: 1% lidocaine without epinephrine
Cautery Type: electrodesiccation
What Was Performed First?: Curettage
Final Size Statement: The size of the lesion after curettage was
Additional Information: (Optional): The wound was cleaned, and a pressure dressing was applied. The patient received detailed post-op instructions.
Consent was obtained from the patient. The risks, benefits and alternatives to therapy were discussed in detail. Specifically, the risks of infection, scarring, bleeding, prolonged wound healing, nerve injury, incomplete removal, allergy to anesthesia and recurrence were addressed. Alternatives to [facility], such as: surgical removal and XRT were also discussed. Prior to the procedure, the treatment site was clearly identified and confirmed by the patient. All components of Universal Protocol/PAUSE Rule completed.
Post-Care Instructions: I reviewed with the patient in detail post-care instructions. Patient is to keep the area dry for 48 hours, and not to engage in any swimming until the area is healed. Should the patient develop any fevers, chills, bleeding, severe pain patient will contact the office immediately.
Bill As A Line Item Or As Units: Line Item

## 2023-04-18 NOTE — PROCEDURE: LIQUID NITROGEN
Post-Care Instructions: I reviewed with the patient in detail post-care instructions. Patient is to wear sunprotection, and avoid picking at any of the treated lesions. Pt may apply Vaseline to crusted or scabbing areas.
Show Spray Paint Technique Variable?: Yes
Duration Of Freeze Thaw-Cycle (Seconds): 5-10
Spray Paint Text: The liquid nitrogen was applied to the skin utilizing a spray paint frosting technique.
Add 52 Modifier (Optional): no
Consent: The patient's consent was obtained including but not limited to risks of crusting, scabbing, blistering, scarring, darker or lighter pigmentary change, recurrence, incomplete removal and infection.
Medical Necessity Information: It is in your best interest to select a reason for this procedure from the list below. All of these items fulfill various CMS LCD requirements except the new and changing color options.
Application Tool (Optional): Liquid Nitrogen Sprayer
Number Of Freeze-Thaw Cycles: 2 freeze-thaw cycles
Medical Necessity Clause: This procedure was medically necessary because the lesions that were treated were:
Detail Level: Detailed

## 2023-08-30 NOTE — NUR
PT MEDICATED AS ORDERS PROVIDE AND ASSISTED WITH CARE NEEDS. PT WAS FOUND WITH
ALL MONITORS OFF, SHE STATED "I PULLED THEM OFF BECAUSE I COULDN'T GET MY
UNDERWEAR BACK ON WITH THOSE HOOKED TO ME."  PT DENIED NEED OF ASSISTANCE WITH
PAD AND UNDERWEAR AT THIS TIME, BUT SHE WAS RECONNECTED TO MONITOR. V/S
ASSESSED STABLE AT THIS TIME. 95% ON 10LNC HIGH FLOW, TITRATED TO 10LNC HIGH
FLOW AND STABLE AT 94% Pulmonology

## 2023-10-24 ENCOUNTER — APPOINTMENT (RX ONLY)
Dept: URBAN - NONMETROPOLITAN AREA CLINIC 10 | Facility: CLINIC | Age: 78
Setting detail: DERMATOLOGY
End: 2023-10-24

## 2023-10-24 DIAGNOSIS — L82.1 OTHER SEBORRHEIC KERATOSIS: ICD-10-CM

## 2023-10-24 DIAGNOSIS — Z71.89 OTHER SPECIFIED COUNSELING: ICD-10-CM

## 2023-10-24 DIAGNOSIS — L57.0 ACTINIC KERATOSIS: ICD-10-CM

## 2023-10-24 DIAGNOSIS — Z85.828 PERSONAL HISTORY OF OTHER MALIGNANT NEOPLASM OF SKIN: ICD-10-CM

## 2023-10-24 DIAGNOSIS — D18.0 HEMANGIOMA: ICD-10-CM

## 2023-10-24 DIAGNOSIS — D22 MELANOCYTIC NEVI: ICD-10-CM

## 2023-10-24 DIAGNOSIS — L72.8 OTHER FOLLICULAR CYSTS OF THE SKIN AND SUBCUTANEOUS TISSUE: ICD-10-CM

## 2023-10-24 DIAGNOSIS — L57.8 OTHER SKIN CHANGES DUE TO CHRONIC EXPOSURE TO NONIONIZING RADIATION: ICD-10-CM

## 2023-10-24 DIAGNOSIS — L81.4 OTHER MELANIN HYPERPIGMENTATION: ICD-10-CM

## 2023-10-24 PROBLEM — D18.01 HEMANGIOMA OF SKIN AND SUBCUTANEOUS TISSUE: Status: ACTIVE | Noted: 2023-10-24

## 2023-10-24 PROBLEM — D22.5 MELANOCYTIC NEVI OF TRUNK: Status: ACTIVE | Noted: 2023-10-24

## 2023-10-24 PROBLEM — D22.39 MELANOCYTIC NEVI OF OTHER PARTS OF FACE: Status: ACTIVE | Noted: 2023-10-24

## 2023-10-24 PROCEDURE — ? DEFER

## 2023-10-24 PROCEDURE — ? COUNSELING

## 2023-10-24 PROCEDURE — 99214 OFFICE O/P EST MOD 30 MIN: CPT

## 2023-10-24 PROCEDURE — ? PRESCRIPTION MEDICATION MANAGEMENT

## 2023-10-24 PROCEDURE — ? PRESCRIPTION

## 2023-10-24 RX ORDER — FLUOROURACIL 2 G/40G
CREAM TOPICAL BID
Qty: 40 | Refills: 0 | Status: ERX | COMMUNITY
Start: 2023-10-24

## 2023-10-24 RX ADMIN — FLUOROURACIL 1: 2 CREAM TOPICAL at 00:00

## 2023-10-24 ASSESSMENT — LOCATION DETAILED DESCRIPTION DERM
LOCATION DETAILED: INFERIOR MID FOREHEAD
LOCATION DETAILED: LEFT SUPERIOR UPPER BACK
LOCATION DETAILED: EPIGASTRIC SKIN
LOCATION DETAILED: RIGHT PROXIMAL DORSAL FOREARM
LOCATION DETAILED: LEFT DISTAL DORSAL FOREARM
LOCATION DETAILED: LEFT DISTAL PRETIBIAL REGION
LOCATION DETAILED: RIGHT DISTAL PRETIBIAL REGION
LOCATION DETAILED: LEFT PROXIMAL POSTERIOR UPPER ARM
LOCATION DETAILED: LEFT PROXIMAL CALF
LOCATION DETAILED: RIGHT DISTAL POSTERIOR THIGH
LOCATION DETAILED: LEFT SUPERIOR MEDIAL UPPER BACK
LOCATION DETAILED: LEFT PROXIMAL DORSAL FOREARM
LOCATION DETAILED: LEFT DISTAL CALF
LOCATION DETAILED: GLABELLA
LOCATION DETAILED: MIDDLE STERNUM
LOCATION DETAILED: RIGHT MEDIAL SUPERIOR CHEST
LOCATION DETAILED: RIGHT INFERIOR UPPER BACK
LOCATION DETAILED: RIGHT PROXIMAL PRETIBIAL REGION
LOCATION DETAILED: RIGHT ANTERIOR PROXIMAL UPPER ARM
LOCATION DETAILED: LEFT ANTERIOR PROXIMAL UPPER ARM
LOCATION DETAILED: INFERIOR THORACIC SPINE
LOCATION DETAILED: SUPERIOR THORACIC SPINE
LOCATION DETAILED: PERIUMBILICAL SKIN
LOCATION DETAILED: NASAL SUPRATIP
LOCATION DETAILED: LEFT INFERIOR UPPER BACK
LOCATION DETAILED: RIGHT INFERIOR CENTRAL MALAR CHEEK
LOCATION DETAILED: LEFT PROXIMAL PRETIBIAL REGION
LOCATION DETAILED: RIGHT PROXIMAL POSTERIOR UPPER ARM
LOCATION DETAILED: LEFT INFERIOR CENTRAL MALAR CHEEK
LOCATION DETAILED: UPPER STERNUM
LOCATION DETAILED: RIGHT ANTERIOR DISTAL THIGH
LOCATION DETAILED: LEFT ANTERIOR DISTAL THIGH
LOCATION DETAILED: RIGHT NASAL SIDEWALL
LOCATION DETAILED: RIGHT SUPERIOR UPPER BACK

## 2023-10-24 ASSESSMENT — LOCATION SIMPLE DESCRIPTION DERM
LOCATION SIMPLE: GLABELLA
LOCATION SIMPLE: LEFT FOREARM
LOCATION SIMPLE: NOSE
LOCATION SIMPLE: RIGHT THIGH
LOCATION SIMPLE: CHEST
LOCATION SIMPLE: UPPER BACK
LOCATION SIMPLE: LEFT CALF
LOCATION SIMPLE: RIGHT POSTERIOR UPPER ARM
LOCATION SIMPLE: RIGHT UPPER BACK
LOCATION SIMPLE: LEFT THIGH
LOCATION SIMPLE: LEFT PRETIBIAL REGION
LOCATION SIMPLE: LEFT POSTERIOR UPPER ARM
LOCATION SIMPLE: RIGHT CHEEK
LOCATION SIMPLE: RIGHT UPPER ARM
LOCATION SIMPLE: LEFT UPPER BACK
LOCATION SIMPLE: LEFT CHEEK
LOCATION SIMPLE: INFERIOR FOREHEAD
LOCATION SIMPLE: RIGHT FOREARM
LOCATION SIMPLE: LEFT UPPER ARM
LOCATION SIMPLE: ABDOMEN
LOCATION SIMPLE: RIGHT POSTERIOR THIGH
LOCATION SIMPLE: RIGHT NOSE
LOCATION SIMPLE: RIGHT PRETIBIAL REGION

## 2023-10-24 ASSESSMENT — LOCATION ZONE DERM
LOCATION ZONE: LEG
LOCATION ZONE: TRUNK
LOCATION ZONE: NOSE
LOCATION ZONE: ARM
LOCATION ZONE: FACE

## 2023-10-24 NOTE — PROCEDURE: PRESCRIPTION MEDICATION MANAGEMENT
Detail Level: Zone
Initiate Treatment: :\\nEfudex 5 % topical cream bid\\nQuantity: 40.0 g  Days Supply: 30\\nSig: Apply to left arm  bid x 2 weeks then stop
Render In Strict Bullet Format?: No

## 2024-08-06 NOTE — NUR
PT AWAKE AND RESTING COMFORTABLY IN BED.  IV PATENT.  PT DENIES ANY PAIN OR
DISCOMFORT AT THIS TIME.  SAFETY MEASURES ARE IN PLACE.  CALL LIGHT WITHIN
PATIENT'S REACH.  WILL CONTINUE TO MONITOR CLOSELY Returned [facility] physician line and verified this is the correct number.  The representative verified they processed the order for pt this morning and writing RN did not need to provide prescriber's controlled substance number.